# Patient Record
Sex: FEMALE | Race: WHITE | Employment: OTHER | URBAN - METROPOLITAN AREA
[De-identification: names, ages, dates, MRNs, and addresses within clinical notes are randomized per-mention and may not be internally consistent; named-entity substitution may affect disease eponyms.]

---

## 2018-04-24 DIAGNOSIS — I48.91 ATRIAL FIBRILLATION, UNSPECIFIED TYPE (HCC): ICD-10-CM

## 2018-05-04 ENCOUNTER — OFFICE VISIT (OUTPATIENT)
Dept: NON INVASIVE DIAGNOSTICS | Age: 68
End: 2018-05-04
Payer: COMMERCIAL

## 2018-05-04 VITALS
RESPIRATION RATE: 16 BRPM | DIASTOLIC BLOOD PRESSURE: 80 MMHG | HEART RATE: 55 BPM | WEIGHT: 192 LBS | HEIGHT: 66 IN | BODY MASS INDEX: 30.86 KG/M2 | SYSTOLIC BLOOD PRESSURE: 120 MMHG

## 2018-05-04 DIAGNOSIS — I49.5 SSS (SICK SINUS SYNDROME) (HCC): ICD-10-CM

## 2018-05-04 DIAGNOSIS — I48.91 ATRIAL FIBRILLATION, UNSPECIFIED TYPE (HCC): Primary | ICD-10-CM

## 2018-05-04 PROCEDURE — 93000 ELECTROCARDIOGRAM COMPLETE: CPT | Performed by: INTERNAL MEDICINE

## 2018-05-04 PROCEDURE — G8400 PT W/DXA NO RESULTS DOC: HCPCS | Performed by: INTERNAL MEDICINE

## 2018-05-04 PROCEDURE — 1123F ACP DISCUSS/DSCN MKR DOCD: CPT | Performed by: INTERNAL MEDICINE

## 2018-05-04 PROCEDURE — G8598 ASA/ANTIPLAT THER USED: HCPCS | Performed by: INTERNAL MEDICINE

## 2018-05-04 PROCEDURE — 99214 OFFICE O/P EST MOD 30 MIN: CPT | Performed by: INTERNAL MEDICINE

## 2018-05-04 PROCEDURE — 1090F PRES/ABSN URINE INCON ASSESS: CPT | Performed by: INTERNAL MEDICINE

## 2018-05-04 PROCEDURE — G8417 CALC BMI ABV UP PARAM F/U: HCPCS | Performed by: INTERNAL MEDICINE

## 2018-05-04 PROCEDURE — 4040F PNEUMOC VAC/ADMIN/RCVD: CPT | Performed by: INTERNAL MEDICINE

## 2018-05-04 PROCEDURE — G8427 DOCREV CUR MEDS BY ELIG CLIN: HCPCS | Performed by: INTERNAL MEDICINE

## 2018-05-04 PROCEDURE — 3017F COLORECTAL CA SCREEN DOC REV: CPT | Performed by: INTERNAL MEDICINE

## 2018-05-04 PROCEDURE — 1036F TOBACCO NON-USER: CPT | Performed by: INTERNAL MEDICINE

## 2018-12-03 ENCOUNTER — TELEPHONE (OUTPATIENT)
Dept: ADMINISTRATIVE | Age: 68
End: 2018-12-03

## 2018-12-04 ENCOUNTER — OFFICE VISIT (OUTPATIENT)
Dept: NON INVASIVE DIAGNOSTICS | Age: 68
End: 2018-12-04
Payer: COMMERCIAL

## 2018-12-04 VITALS
SYSTOLIC BLOOD PRESSURE: 134 MMHG | HEART RATE: 54 BPM | WEIGHT: 190 LBS | RESPIRATION RATE: 17 BRPM | HEIGHT: 69 IN | DIASTOLIC BLOOD PRESSURE: 78 MMHG | BODY MASS INDEX: 28.14 KG/M2

## 2018-12-04 DIAGNOSIS — I49.5 SSS (SICK SINUS SYNDROME) (HCC): Primary | ICD-10-CM

## 2018-12-04 PROCEDURE — 1036F TOBACCO NON-USER: CPT | Performed by: INTERNAL MEDICINE

## 2018-12-04 PROCEDURE — 1090F PRES/ABSN URINE INCON ASSESS: CPT | Performed by: INTERNAL MEDICINE

## 2018-12-04 PROCEDURE — 3017F COLORECTAL CA SCREEN DOC REV: CPT | Performed by: INTERNAL MEDICINE

## 2018-12-04 PROCEDURE — G8400 PT W/DXA NO RESULTS DOC: HCPCS | Performed by: INTERNAL MEDICINE

## 2018-12-04 PROCEDURE — 1101F PT FALLS ASSESS-DOCD LE1/YR: CPT | Performed by: INTERNAL MEDICINE

## 2018-12-04 PROCEDURE — G8417 CALC BMI ABV UP PARAM F/U: HCPCS | Performed by: INTERNAL MEDICINE

## 2018-12-04 PROCEDURE — G8598 ASA/ANTIPLAT THER USED: HCPCS | Performed by: INTERNAL MEDICINE

## 2018-12-04 PROCEDURE — 4040F PNEUMOC VAC/ADMIN/RCVD: CPT | Performed by: INTERNAL MEDICINE

## 2018-12-04 PROCEDURE — 99214 OFFICE O/P EST MOD 30 MIN: CPT | Performed by: INTERNAL MEDICINE

## 2018-12-04 PROCEDURE — 1123F ACP DISCUSS/DSCN MKR DOCD: CPT | Performed by: INTERNAL MEDICINE

## 2018-12-04 PROCEDURE — G8484 FLU IMMUNIZE NO ADMIN: HCPCS | Performed by: INTERNAL MEDICINE

## 2018-12-04 PROCEDURE — 93000 ELECTROCARDIOGRAM COMPLETE: CPT | Performed by: INTERNAL MEDICINE

## 2018-12-04 PROCEDURE — G8427 DOCREV CUR MEDS BY ELIG CLIN: HCPCS | Performed by: INTERNAL MEDICINE

## 2018-12-04 NOTE — TELEPHONE ENCOUNTER
I spoke with Rosio Bundy and let her know I had an opening with Dr Jacqueline Kaiser this afternoon at 1:00, pt scheduled.

## 2018-12-13 ENCOUNTER — OFFICE VISIT (OUTPATIENT)
Dept: CARDIOLOGY CLINIC | Age: 68
End: 2018-12-13
Payer: COMMERCIAL

## 2018-12-13 VITALS
BODY MASS INDEX: 32.15 KG/M2 | WEIGHT: 193 LBS | RESPIRATION RATE: 16 BRPM | DIASTOLIC BLOOD PRESSURE: 86 MMHG | HEART RATE: 56 BPM | SYSTOLIC BLOOD PRESSURE: 132 MMHG | HEIGHT: 65 IN

## 2018-12-13 DIAGNOSIS — I48.91 ATRIAL FIBRILLATION, UNSPECIFIED TYPE (HCC): Primary | ICD-10-CM

## 2018-12-13 PROCEDURE — 4040F PNEUMOC VAC/ADMIN/RCVD: CPT | Performed by: INTERNAL MEDICINE

## 2018-12-13 PROCEDURE — 99214 OFFICE O/P EST MOD 30 MIN: CPT | Performed by: INTERNAL MEDICINE

## 2018-12-13 PROCEDURE — G8417 CALC BMI ABV UP PARAM F/U: HCPCS | Performed by: INTERNAL MEDICINE

## 2018-12-13 PROCEDURE — G8598 ASA/ANTIPLAT THER USED: HCPCS | Performed by: INTERNAL MEDICINE

## 2018-12-13 PROCEDURE — 1101F PT FALLS ASSESS-DOCD LE1/YR: CPT | Performed by: INTERNAL MEDICINE

## 2018-12-13 PROCEDURE — 3017F COLORECTAL CA SCREEN DOC REV: CPT | Performed by: INTERNAL MEDICINE

## 2018-12-13 PROCEDURE — 1123F ACP DISCUSS/DSCN MKR DOCD: CPT | Performed by: INTERNAL MEDICINE

## 2018-12-13 PROCEDURE — G8484 FLU IMMUNIZE NO ADMIN: HCPCS | Performed by: INTERNAL MEDICINE

## 2018-12-13 PROCEDURE — G8427 DOCREV CUR MEDS BY ELIG CLIN: HCPCS | Performed by: INTERNAL MEDICINE

## 2018-12-13 PROCEDURE — 1036F TOBACCO NON-USER: CPT | Performed by: INTERNAL MEDICINE

## 2018-12-13 PROCEDURE — 1090F PRES/ABSN URINE INCON ASSESS: CPT | Performed by: INTERNAL MEDICINE

## 2018-12-13 PROCEDURE — 93000 ELECTROCARDIOGRAM COMPLETE: CPT | Performed by: INTERNAL MEDICINE

## 2018-12-13 PROCEDURE — G8400 PT W/DXA NO RESULTS DOC: HCPCS | Performed by: INTERNAL MEDICINE

## 2018-12-13 RX ORDER — METOPROLOL SUCCINATE 50 MG/1
TABLET, EXTENDED RELEASE ORAL
Refills: 0 | COMMUNITY
Start: 2018-12-01 | End: 2019-05-21

## 2018-12-13 NOTE — PROGRESS NOTES
current facility-administered medications for this visit. Social History     Social History    Marital status:      Spouse name: N/A    Number of children: N/A    Years of education: N/A     Occupational History    Not on file. Social History Main Topics    Smoking status: Former Smoker     Quit date: 7/8/2015    Smokeless tobacco: Never Used    Alcohol use No    Drug use: No    Sexual activity: Not on file     Other Topics Concern    Not on file     Social History Narrative    No narrative on file       History reviewed. No pertinent family history. Review of Systems:  Heart: as above   Lungs: as above   Eyes: denies changes in vision or discharge. Ears: denies changes in hearing or pain. Nose: denies epistaxis or masses   Throat: denies sore throat or trouble swallowing. Neuro: denies numbness, tingling, tremors. Skin: denies rashes or itching. : denies hematuria, dysuria   GI: denies vomiting, diarrhea   Psych: denies mood changed, anxiety, depression. All other systems negative. Physical Exam   /86   Pulse 56   Resp 16   Ht 5' 5\" (1.651 m)   Wt 193 lb (87.5 kg)   BMI 32.12 kg/m²   Constitutional: Oriented to person, place, and time. No distress. Pale. Head: Normocephalic and atraumatic. Eyes: EOM are normal. Pupils are equal, round, and reactive to light. Neck: Normal range of motion. Neck supple. No hepatojugular reflux and no JVD present. Carotid bruit is not present. No tracheal deviation present. No thyromegaly present. Cardiovascular: Normal rate, regular rhythm, normal heart sounds and intact distal pulses. Exam reveals no gallop and no friction rub. No murmur heard. Pulmonary/Chest: Effort normal and breath sounds normal. No respiratory distress. No wheezes. No rales. No tenderness. Abdominal: Soft. Bowel sounds are normal. No distension and no mass. No tenderness. No rebound and no guarding. Musculoskeletal: Normal range of motion.

## 2019-05-21 ENCOUNTER — OFFICE VISIT (OUTPATIENT)
Dept: CARDIOLOGY CLINIC | Age: 69
End: 2019-05-21
Payer: COMMERCIAL

## 2019-05-21 VITALS
HEART RATE: 55 BPM | HEIGHT: 65 IN | RESPIRATION RATE: 18 BRPM | WEIGHT: 196 LBS | DIASTOLIC BLOOD PRESSURE: 78 MMHG | BODY MASS INDEX: 32.65 KG/M2 | SYSTOLIC BLOOD PRESSURE: 130 MMHG

## 2019-05-21 DIAGNOSIS — I48.91 ATRIAL FIBRILLATION, UNSPECIFIED TYPE (HCC): Primary | ICD-10-CM

## 2019-05-21 PROCEDURE — 1036F TOBACCO NON-USER: CPT | Performed by: INTERNAL MEDICINE

## 2019-05-21 PROCEDURE — 93000 ELECTROCARDIOGRAM COMPLETE: CPT | Performed by: INTERNAL MEDICINE

## 2019-05-21 PROCEDURE — G8417 CALC BMI ABV UP PARAM F/U: HCPCS | Performed by: INTERNAL MEDICINE

## 2019-05-21 PROCEDURE — 1123F ACP DISCUSS/DSCN MKR DOCD: CPT | Performed by: INTERNAL MEDICINE

## 2019-05-21 PROCEDURE — G8427 DOCREV CUR MEDS BY ELIG CLIN: HCPCS | Performed by: INTERNAL MEDICINE

## 2019-05-21 PROCEDURE — 1090F PRES/ABSN URINE INCON ASSESS: CPT | Performed by: INTERNAL MEDICINE

## 2019-05-21 PROCEDURE — 99214 OFFICE O/P EST MOD 30 MIN: CPT | Performed by: INTERNAL MEDICINE

## 2019-05-21 PROCEDURE — 3017F COLORECTAL CA SCREEN DOC REV: CPT | Performed by: INTERNAL MEDICINE

## 2019-05-21 PROCEDURE — G8400 PT W/DXA NO RESULTS DOC: HCPCS | Performed by: INTERNAL MEDICINE

## 2019-05-21 PROCEDURE — 4040F PNEUMOC VAC/ADMIN/RCVD: CPT | Performed by: INTERNAL MEDICINE

## 2019-05-21 PROCEDURE — G8598 ASA/ANTIPLAT THER USED: HCPCS | Performed by: INTERNAL MEDICINE

## 2019-05-21 RX ORDER — METOPROLOL SUCCINATE 50 MG/1
50 TABLET, EXTENDED RELEASE ORAL DAILY
Qty: 90 TABLET | Refills: 3 | Status: SHIPPED | OUTPATIENT
Start: 2019-05-21 | End: 2019-10-22 | Stop reason: DRUGHIGH

## 2019-05-21 NOTE — PROGRESS NOTES
CHIEF COMPLAINT: FU CAD-MI/Afib/CMP    HISTORY OF PRESENT ILLNESS: Patient is a 68 y.o. female seen at the request of Rocky Penny DO. Patient presents in follow up. Patient was treated emergently for an inferior MI 7/8/15 with BMS to RCA at [facility]. She also has paroxysmal atrial fibrillation. In sinus today. No CP or SOB. Past Medical History:   Diagnosis Date    Acute systolic CHF (congestive heart failure) ([facility]) 2/17/2016    Asthma     Atrial fibrillation ([facility])     CAD (coronary artery disease)     GERD (gastroesophageal reflux disease)     Hiatal hernia     Macular degeneration     MI (myocardial infarction) ([facility] 2015    Obesity     S/P angioplasty with stent     Syncopal episodes     Weakness        Patient Active Problem List   Diagnosis    CAD (coronary artery disease)    Atrial fibrillation ([facility])    SSS (sick sinus syndrome) ([facility])    Obesity (BMI 30.0-34. 9)    VHD (valvular heart disease)       Allergies   Allergen Reactions    Ace Inhibitors Other (See Comments)     Cough    Bactrim [Sulfamethoxazole-Trimethoprim]     Erythromycin     Levaquin [Levofloxacin]     Lisinopril     Losartan     Pcn [Penicillins]     Sulfa Antibiotics        Current Outpatient Medications   Medication Sig Dispense Refill    apixaban (ELIQUIS) 5 MG TABS tablet Take 1 tablet by mouth 2 times daily 180 tablet 3    metoprolol succinate (TOPROL XL) 50 MG extended release tablet take 1 tablet by mouth twice a day  0    Calcium Carb-Cholecalciferol (CALCIUM 600+D3 PO) Take by mouth daily      fenofibrate (TRICOR) 48 MG tablet take 1 tablet by mouth once daily  0    sotalol (BETAPACE) 120 MG tablet Take 1 tablet by mouth every 12 hours 60 tablet 3    ranitidine (ZANTAC) 150 MG capsule Take 150 mg by mouth as needed for Heartburn      aspirin EC 81 MG EC tablet Take 81 mg by mouth daily      alendronate (FOSAMAX) 35 MG tablet Take 35 mg by mouth every 7 days   0     No current facility-administered medications for this visit. Social History     Socioeconomic History    Marital status:      Spouse name: Not on file    Number of children: Not on file    Years of education: Not on file    Highest education level: Not on file   Occupational History    Not on file   Social Needs    Financial resource strain: Not on file    Food insecurity:     Worry: Not on file     Inability: Not on file    Transportation needs:     Medical: Not on file     Non-medical: Not on file   Tobacco Use    Smoking status: Former Smoker     Last attempt to quit: 7/8/2015     Years since quitting: 3.8    Smokeless tobacco: Never Used   Substance and Sexual Activity    Alcohol use: No    Drug use: No    Sexual activity: Not on file   Lifestyle    Physical activity:     Days per week: Not on file     Minutes per session: Not on file    Stress: Not on file   Relationships    Social connections:     Talks on phone: Not on file     Gets together: Not on file     Attends Hinduism service: Not on file     Active member of club or organization: Not on file     Attends meetings of clubs or organizations: Not on file     Relationship status: Not on file    Intimate partner violence:     Fear of current or ex partner: Not on file     Emotionally abused: Not on file     Physically abused: Not on file     Forced sexual activity: Not on file   Other Topics Concern    Not on file   Social History Narrative    Not on file       History reviewed. No pertinent family history. Review of Systems:  Heart: as above   Lungs: as above   Eyes: denies changes in vision or discharge. Ears: denies changes in hearing or pain. Nose: denies epistaxis or masses   Throat: denies sore throat or trouble swallowing. Neuro: denies numbness, tingling, tremors. Skin: denies rashes or itching. : denies hematuria, dysuria   GI: denies vomiting, diarrhea   Psych: denies mood changed, anxiety, depression.   All other systems negative. Physical Exam   /78   Pulse 55   Resp 18   Ht 5' 5\" (1.651 m)   Wt 196 lb (88.9 kg)   BMI 32.62 kg/m²   Constitutional: Oriented to person, place, and time. No distress. Pale. Head: Normocephalic and atraumatic. Eyes: EOM are normal. Pupils are equal, round, and reactive to light. Neck: Normal range of motion. Neck supple. No hepatojugular reflux and no JVD present. Carotid bruit is not present. No tracheal deviation present. No thyromegaly present. Cardiovascular: Normal rate, regular rhythm, normal heart sounds and intact distal pulses. Exam reveals no gallop and no friction rub. No murmur heard. Pulmonary/Chest: Effort normal and breath sounds normal. No respiratory distress. No wheezes. No rales. No tenderness. Abdominal: Soft. Bowel sounds are normal. No distension and no mass. No tenderness. No rebound and no guarding. Musculoskeletal: Normal range of motion. No edema and no tenderness. Lymphadenopathy:   No cervical adenopathy. No groin adenopathy. Neurological: Alert and oriented to person, place, and time. Skin: Skin is warm and dry. No rash noted. Not diaphoretic. No erythema. Psychiatric: Normal mood and affect. Behavior is normal.     EKG: Sinus bradycardia, nonspecific ST and T waves changes. QTc is 478 ms. ASSESSMENT AND PLAN:  Patient Active Problem List   Diagnosis    CAD (coronary artery disease)    Atrial fibrillation (HCC)    SSS (sick sinus syndrome) (HCC)    Obesity (BMI 30.0-34. 9)    VHD (valvular heart disease)     1. PAfib: Followed by EP. In sinus. On sotalol and Eliquis. 2. CMP:    Continue Toprol and losartan. 3. CAD: Continue BB/statin/ASA. Anisa Moran D.O.   Cardiologist  Cardiology, [address] [place] [address]

## 2019-06-03 NOTE — PROGRESS NOTES
Electrophysiology Outpatient Progress Note    Destiny Aiken  1950  Date of Service: 6/6/2019  Referring Provider/PCP: Brain Wallace DO  Primary Electrophysiologist: Anatoly Vance MD    Chief Complaint: Persistent AF on chronic Sotalol therapy    SUBJECTIVE: Destiny Aiken presents to the office today for a follow -up. Since her last office visit Ms. Bill Mann states she feels overall well and denies any episodes of AF since her hospitalization at [facility] in April 2019. At that time she was seen by Dr. Fredrick Rodriguez and had a successful cardioversion and her Toprol was lowered from 50mg bid to 25 mg bid. She does report she was seen in December 2018 at [facility] for the same complaints. The patient denies any chest pain, dyspnea, palpitations, dizziness, syncope, orthopnea or paroxysmal nocturnal dyspnea. Discussed with Ms. Bill Mann that if she continues to have breakthrough episodes of AF will then consider changing Sotalol to Tikosyn. She states she does snore at night and has not been worked up for sleep apnea.       Patient Active Problem List    Diagnosis Date Noted    Obesity (BMI 30.0-34.9) 09/27/2016    VHD (valvular heart disease) 09/27/2016     Overview Note:     Moderate MR       [facility] 02/17/2016    CAD (coronary artery disease)     Atrial fibrillation (HCC)        Current Outpatient Medications   Medication Sig Dispense Refill    rosuvastatin (CRESTOR) 20 MG tablet Take 20 mg by mouth daily      metoprolol succinate (TOPROL XL) 50 MG extended release tablet Take 1 tablet by mouth daily 90 tablet 3    apixaban (ELIQUIS) 5 MG TABS tablet Take 1 tablet by mouth 2 times daily 180 tablet 3    alendronate (FOSAMAX) 35 MG tablet Take 35 mg by mouth every 7 days   0    fenofibrate (TRICOR) 48 MG tablet take 1 tablet by mouth once daily  0    sotalol (BETAPACE) 120 MG tablet Take 1 tablet by mouth every 12 hours 60 tablet 3    ranitidine (ZANTAC) 150 MG capsule Take 150 mg by mouth as needed for Heartburn      aspirin EC 81 MG EC tablet Take 81 mg by mouth daily       No current facility-administered medications for this visit. Allergies   Allergen Reactions    Ace Inhibitors Other (See Comments)     Cough    Bactrim [Sulfamethoxazole-Trimethoprim]     Erythromycin     Levaquin [Levofloxacin]     Lisinopril     Losartan     Pcn [Penicillins]     Sulfa Antibiotics        PHYSICAL EXAM:  Vitals:    06/06/19 0908   BP: 136/68   Pulse: 53   Resp: 18   Weight: 195 lb (88.5 kg)   Height: 5' 6\" (1.676 m)   Constitutional: Oriented to person, place, and time. Well-developed and cooperative. Obese female   Head: Normocephalic and atraumatic. Cardiovascular:  Normal S1/ S2, Feet appear to be well perfused. Regular rhythm present. PMI is not displaced. Pulmonary/Chest: Effort normal and breath sounds normal. No respiratory distress. Musculoskeletal: Normal range of motion of all extremities, no muscle weakness. Neurological: Alert and oriented to person, place, and time. Gait normal.   Skin: Skin is warm and dry. No bruising, no ecchymosis and no rash noted. Extremity: No clubbing or cyanosis. No edema. Psychiatric: Normal mood and affect. Thought content normal.     EKG 6/6/19: sinus bradycardia,  Rate 53, , QRS 86, QTc 472- see scanned cardiology    Assessment:     1. Persistent atrial fibrillation and flutter  - Discovered in 2000. - Symptomatic; SOB; palpitations   - [id number] 4 (Vasc disease, CAD, HF, female)  - Current anticoagulation includes Eliquis. - Previously on Multaq; stopped due to side effects.   - She has a contraindication to class IC antiarrhythmic drugs given the CAD. - Receiving Sotalol 120 mg every 12 hours; initiated 2/2016.  - Reccurent PAF in December 2018 and April 2019.  - S/p DC-cardioversion April 2019. - Now in SR, QTc stable.    - Re-education on importance of well controlled HTN (goal BP < 130/80),

## 2019-06-06 ENCOUNTER — OFFICE VISIT (OUTPATIENT)
Dept: NON INVASIVE DIAGNOSTICS | Age: 69
End: 2019-06-06
Payer: COMMERCIAL

## 2019-06-06 VITALS
HEART RATE: 53 BPM | RESPIRATION RATE: 18 BRPM | WEIGHT: 195 LBS | SYSTOLIC BLOOD PRESSURE: 136 MMHG | DIASTOLIC BLOOD PRESSURE: 68 MMHG | HEIGHT: 66 IN | BODY MASS INDEX: 31.34 KG/M2

## 2019-06-06 DIAGNOSIS — I48.91 ATRIAL FIBRILLATION, UNSPECIFIED TYPE (HCC): Primary | ICD-10-CM

## 2019-06-06 PROCEDURE — 99214 OFFICE O/P EST MOD 30 MIN: CPT | Performed by: INTERNAL MEDICINE

## 2019-06-06 PROCEDURE — 1090F PRES/ABSN URINE INCON ASSESS: CPT | Performed by: INTERNAL MEDICINE

## 2019-06-06 PROCEDURE — G8400 PT W/DXA NO RESULTS DOC: HCPCS | Performed by: INTERNAL MEDICINE

## 2019-06-06 PROCEDURE — 1123F ACP DISCUSS/DSCN MKR DOCD: CPT | Performed by: INTERNAL MEDICINE

## 2019-06-06 PROCEDURE — G8417 CALC BMI ABV UP PARAM F/U: HCPCS | Performed by: INTERNAL MEDICINE

## 2019-06-06 PROCEDURE — 1036F TOBACCO NON-USER: CPT | Performed by: INTERNAL MEDICINE

## 2019-06-06 PROCEDURE — 3017F COLORECTAL CA SCREEN DOC REV: CPT | Performed by: INTERNAL MEDICINE

## 2019-06-06 PROCEDURE — 93000 ELECTROCARDIOGRAM COMPLETE: CPT | Performed by: INTERNAL MEDICINE

## 2019-06-06 PROCEDURE — G8427 DOCREV CUR MEDS BY ELIG CLIN: HCPCS | Performed by: INTERNAL MEDICINE

## 2019-06-06 PROCEDURE — 4040F PNEUMOC VAC/ADMIN/RCVD: CPT | Performed by: INTERNAL MEDICINE

## 2019-06-06 PROCEDURE — G8598 ASA/ANTIPLAT THER USED: HCPCS | Performed by: INTERNAL MEDICINE

## 2019-06-06 RX ORDER — ROSUVASTATIN CALCIUM 20 MG/1
20 TABLET, COATED ORAL DAILY
Status: ON HOLD | COMMUNITY
End: 2019-11-06

## 2019-06-20 ENCOUNTER — OFFICE VISIT (OUTPATIENT)
Dept: CARDIOLOGY CLINIC | Age: 69
End: 2019-06-20
Payer: COMMERCIAL

## 2019-06-20 VITALS
SYSTOLIC BLOOD PRESSURE: 128 MMHG | RESPIRATION RATE: 18 BRPM | HEART RATE: 59 BPM | WEIGHT: 197 LBS | HEIGHT: 65 IN | BODY MASS INDEX: 32.82 KG/M2 | DIASTOLIC BLOOD PRESSURE: 82 MMHG

## 2019-06-20 DIAGNOSIS — I48.0 PAROXYSMAL ATRIAL FIBRILLATION (HCC): Primary | ICD-10-CM

## 2019-06-20 DIAGNOSIS — I25.5 ISCHEMIC CARDIOMYOPATHY: ICD-10-CM

## 2019-06-20 DIAGNOSIS — E66.9 NON MORBID OBESITY: ICD-10-CM

## 2019-06-20 DIAGNOSIS — R06.02 SOBOE (SHORTNESS OF BREATH ON EXERTION): ICD-10-CM

## 2019-06-20 DIAGNOSIS — I25.10 CORONARY ARTERY DISEASE INVOLVING NATIVE CORONARY ARTERY OF NATIVE HEART WITHOUT ANGINA PECTORIS: ICD-10-CM

## 2019-06-20 DIAGNOSIS — I34.0 NON-RHEUMATIC MITRAL REGURGITATION: ICD-10-CM

## 2019-06-20 PROCEDURE — 3017F COLORECTAL CA SCREEN DOC REV: CPT | Performed by: INTERNAL MEDICINE

## 2019-06-20 PROCEDURE — G8427 DOCREV CUR MEDS BY ELIG CLIN: HCPCS | Performed by: INTERNAL MEDICINE

## 2019-06-20 PROCEDURE — G8417 CALC BMI ABV UP PARAM F/U: HCPCS | Performed by: INTERNAL MEDICINE

## 2019-06-20 PROCEDURE — 1123F ACP DISCUSS/DSCN MKR DOCD: CPT | Performed by: INTERNAL MEDICINE

## 2019-06-20 PROCEDURE — 99214 OFFICE O/P EST MOD 30 MIN: CPT | Performed by: INTERNAL MEDICINE

## 2019-06-20 PROCEDURE — G8400 PT W/DXA NO RESULTS DOC: HCPCS | Performed by: INTERNAL MEDICINE

## 2019-06-20 PROCEDURE — 93000 ELECTROCARDIOGRAM COMPLETE: CPT | Performed by: INTERNAL MEDICINE

## 2019-06-20 PROCEDURE — 1036F TOBACCO NON-USER: CPT | Performed by: INTERNAL MEDICINE

## 2019-06-20 PROCEDURE — G8598 ASA/ANTIPLAT THER USED: HCPCS | Performed by: INTERNAL MEDICINE

## 2019-06-20 PROCEDURE — 1090F PRES/ABSN URINE INCON ASSESS: CPT | Performed by: INTERNAL MEDICINE

## 2019-06-20 PROCEDURE — 4040F PNEUMOC VAC/ADMIN/RCVD: CPT | Performed by: INTERNAL MEDICINE

## 2019-06-20 RX ORDER — SOTALOL HYDROCHLORIDE 120 MG/1
120 TABLET ORAL EVERY 12 HOURS
Qty: 180 TABLET | Refills: 3 | Status: SHIPPED | OUTPATIENT
Start: 2019-06-20 | End: 2019-10-22 | Stop reason: SDUPTHER

## 2019-06-20 NOTE — PROGRESS NOTES
OFFICE VISIT     PRIMARY CARE PHYSICIAN:      Bridger Cao DO       ALLERGIES / SENSITIVITIES:        Allergies   Allergen Reactions    Ace Inhibitors Other (See Comments)     Cough    Bactrim [Sulfamethoxazole-Trimethoprim]     Erythromycin     Levaquin [Levofloxacin]     Lisinopril     Losartan     Pcn [Penicillins]     Sulfa Antibiotics           REVIEWED MEDICATIONS:        Current Outpatient Medications:     sotalol (BETAPACE) 120 MG tablet, Take 1 tablet by mouth every 12 hours, Disp: 180 tablet, Rfl: 3    rosuvastatin (CRESTOR) 20 MG tablet, Take 20 mg by mouth daily, Disp: , Rfl:     metoprolol succinate (TOPROL XL) 50 MG extended release tablet, Take 1 tablet by mouth daily, Disp: 90 tablet, Rfl: 3    apixaban (ELIQUIS) 5 MG TABS tablet, Take 1 tablet by mouth 2 times daily, Disp: 180 tablet, Rfl: 3    alendronate (FOSAMAX) 35 MG tablet, Take 35 mg by mouth every 7 days , Disp: , Rfl: 0    fenofibrate (TRICOR) 48 MG tablet, take 1 tablet by mouth once daily, Disp: , Rfl: 0    ranitidine (ZANTAC) 150 MG capsule, Take 150 mg by mouth as needed for Heartburn, Disp: , Rfl:     aspirin EC 81 MG EC tablet, Take 81 mg by mouth daily, Disp: , Rfl:       S: REASON FOR VISIT:       Chief Complaint   Patient presents with    Follow-Up from Hospital     ER follow up          History of Present Illness:       Office Visit for follow up of CAD, A FFib   Seen in ER few times for palpitaitons   Seen by Dr Javier Mina 6/6/19   C/o mild DENG, No pND or orhtopnea      No hospitalizations or surgeries since last visit   Delfino any exertional chest pain   Active at home   No orthopnea   Try to watch diet   Compliant with all medications       Past Medical History:   Diagnosis Date    Acute systolic CHF (congestive heart failure) ([facility] 2/17/2016    Asthma     Atrial fibrillation ([facility]     CAD (coronary artery disease)     GERD (gastroesophageal reflux disease)     Hiatal hernia     Macular degeneration  [facility]    Obesity     S/P angioplasty with stent     Syncopal episodes     Weakness             Past Surgical History:   Procedure Laterality Date    CARDIOVERSION  02/18/2016    CHOLECYSTECTOMY      COLONOSCOPY      CORONARY ANGIOPLASTY WITH STENT PLACEMENT  07/08/2015    ENDOSCOPY, COLON, DIAGNOSTIC      TUBAL LIGATION      VARICOSE VEIN SURGERY          History reviewed. No pertinent family history. Social History     Tobacco Use    Smoking status: Former Smoker     Last attempt to quit: 7/8/2015     Years since quitting: 3.9    Smokeless tobacco: Never Used   Substance Use Topics    Alcohol use: No         Review of Systems:  HEENT: negative for acute visual symptoms or auditory problems, no dysphagia  Constitutional: negative for fever and chills, or significant weight loss  Respiratory: negative for cough, wheezing, or hemoptysis  Cardiovascular: negative for chest pain, palpitations, and +ve dyspnea  Gastrointestinal: negative for abdominal pain, diarrhea, nausea and vomiting  Endocrine: Negative for polyuria and polydyspsia  Genitourinary:negative for dysuria and hematuria  Derm: negative for rash and skin lesion(s)  Neurological: negative for tingling, numbness, weakness, seizures and tremors  Endocrine: negative for polydipsia and polyuria  Musculoskeletal: negative for pain or tenderness  Psychiatric: negative for anxiety, depression, or suicidal ideations         O:  COMPLETE PHYSICAL EXAM:       /82   Pulse 59   Resp 18   Ht 5' 5\" (1.651 m)   Wt 197 lb (89.4 kg)   BMI 32.78 kg/m²       General:   Patient alert, comfortable, no distress. Appears stated age. HEENT:    Pupils equal, no icterus, no nasal drainage, tongue moist.   Neck:              No masses, Thyroid not palpable. Chest:   Normal configuration, non tender. Lungs:   Clear to auscultation bilaterally, few scattered rhonchi.    Cardiovascular:  Regular rhythm, 1/6 systolic murmur, No S3, no palpable thrills, No elevated JVD, No carotid bruit. Abdomen:  Soft, Non tender, Bowel sounds normal, no pulsatile abdominal aorta, no palpable masses. Extremities:  +No edema. Distal pulses palpable. No cyanosis, no clubbing. Skin:   Good turgor, warm and dry, no cyanosis. Musculoskeletal: No joint swelling or deformity. Neuro:   Cranial nerves grossly intact; No focal neurologic deficit. Psych:   Alert, good mood and effect. REVIEW OF DIAGNOSTIC TESTS:        Electrocardiogram: NSR, Normal QTc interval                A/P:   ASSESSMENT / PLAN:    Angy Escalera was seen today for follow-up from hospital.    Diagnoses and all orders for this visit:    Paroxysmal atrial fibrillation ([address] - Follows with EP - s/p DCCV, On Eliquis, Sotalol    Coronary artery disease involving native coronary artery of native heart without angina pectoris s/p BMS to RCA 2015-On ASA, BB, Statins  -     ECHO Complete 2D W Doppler W Color; Future  -     Stress test, lexiscan; Future    Ischemic cardiomyopathy EF about 40% - Continue BB - Allery to ACE/ARB  -     ECHO Complete 2D W Doppler W Color; Future  -     Stress test, lexiscan; Future    Non morbid obesity - Diet, exercise and weight loss discussed. ACE-I Allergy    Non-rheumatic mitral regurgitation  -     ECHO Complete 2D W Doppler W Color; Future    SOBOE (shortness of breath on exertion)  -     ECHO Complete 2D W Doppler W Color; Future  -     Stress test, lexiscan; Future    Other orders  -     EKG 12 lead  -     sotalol (BETAPACE) 120 MG tablet; Take 1 tablet by mouth every 12 hours       Preventive Cardiology: Low cholesterol diet, regular exercise as tolerate, and gradual weight loss discussed. Monitor BP and heart rates. All questions answered about cardiac diagnoses and cardiac medications. Continue current medications. Compliance with medications and f/u with all physicians discussed.    Risk factor modification based on risk profile discussed. Call if any exertional chest pain, short of breath, dizzy or palpitations   Follow up in 6 months or earlier if needed.          [facility]  [address], [place], [address] [facility]  [phone]

## 2019-07-03 DIAGNOSIS — I25.10 CORONARY ARTERY DISEASE INVOLVING NATIVE CORONARY ARTERY OF NATIVE HEART WITHOUT ANGINA PECTORIS: ICD-10-CM

## 2019-07-03 DIAGNOSIS — R06.02 SOBOE (SHORTNESS OF BREATH ON EXERTION): ICD-10-CM

## 2019-07-03 DIAGNOSIS — I25.5 ISCHEMIC CARDIOMYOPATHY: ICD-10-CM

## 2019-10-22 ENCOUNTER — OFFICE VISIT (OUTPATIENT)
Dept: CARDIOLOGY CLINIC | Age: 69
End: 2019-10-22
Payer: COMMERCIAL

## 2019-10-22 VITALS
RESPIRATION RATE: 18 BRPM | HEIGHT: 65 IN | DIASTOLIC BLOOD PRESSURE: 80 MMHG | SYSTOLIC BLOOD PRESSURE: 136 MMHG | HEART RATE: 61 BPM | WEIGHT: 193 LBS | BODY MASS INDEX: 32.15 KG/M2

## 2019-10-22 DIAGNOSIS — E66.9 NON MORBID OBESITY: ICD-10-CM

## 2019-10-22 DIAGNOSIS — I34.0 NON-RHEUMATIC MITRAL REGURGITATION: ICD-10-CM

## 2019-10-22 DIAGNOSIS — I25.10 CORONARY ARTERY DISEASE INVOLVING NATIVE CORONARY ARTERY OF NATIVE HEART WITHOUT ANGINA PECTORIS: ICD-10-CM

## 2019-10-22 DIAGNOSIS — I48.0 PAROXYSMAL ATRIAL FIBRILLATION (HCC): Primary | ICD-10-CM

## 2019-10-22 DIAGNOSIS — I27.20 PULMONARY HTN (HCC): ICD-10-CM

## 2019-10-22 DIAGNOSIS — I36.1 NONRHEUMATIC TRICUSPID VALVE REGURGITATION: ICD-10-CM

## 2019-10-22 PROCEDURE — 1123F ACP DISCUSS/DSCN MKR DOCD: CPT | Performed by: INTERNAL MEDICINE

## 2019-10-22 PROCEDURE — 1090F PRES/ABSN URINE INCON ASSESS: CPT | Performed by: INTERNAL MEDICINE

## 2019-10-22 PROCEDURE — 99214 OFFICE O/P EST MOD 30 MIN: CPT | Performed by: INTERNAL MEDICINE

## 2019-10-22 PROCEDURE — G8427 DOCREV CUR MEDS BY ELIG CLIN: HCPCS | Performed by: INTERNAL MEDICINE

## 2019-10-22 PROCEDURE — G8417 CALC BMI ABV UP PARAM F/U: HCPCS | Performed by: INTERNAL MEDICINE

## 2019-10-22 PROCEDURE — 1036F TOBACCO NON-USER: CPT | Performed by: INTERNAL MEDICINE

## 2019-10-22 PROCEDURE — 93000 ELECTROCARDIOGRAM COMPLETE: CPT | Performed by: INTERNAL MEDICINE

## 2019-10-22 PROCEDURE — G8598 ASA/ANTIPLAT THER USED: HCPCS | Performed by: INTERNAL MEDICINE

## 2019-10-22 PROCEDURE — G8400 PT W/DXA NO RESULTS DOC: HCPCS | Performed by: INTERNAL MEDICINE

## 2019-10-22 PROCEDURE — 4040F PNEUMOC VAC/ADMIN/RCVD: CPT | Performed by: INTERNAL MEDICINE

## 2019-10-22 PROCEDURE — G8484 FLU IMMUNIZE NO ADMIN: HCPCS | Performed by: INTERNAL MEDICINE

## 2019-10-22 PROCEDURE — 3017F COLORECTAL CA SCREEN DOC REV: CPT | Performed by: INTERNAL MEDICINE

## 2019-10-22 RX ORDER — SOTALOL HYDROCHLORIDE 120 MG/1
120 TABLET ORAL EVERY 12 HOURS
Qty: 180 TABLET | Refills: 3 | Status: SHIPPED
Start: 2019-10-22 | End: 2021-02-09 | Stop reason: SDUPTHER

## 2019-10-22 RX ORDER — METOPROLOL SUCCINATE 25 MG/1
25 TABLET, EXTENDED RELEASE ORAL DAILY
Qty: 90 TABLET | Refills: 3 | Status: ON HOLD
Start: 2019-10-22 | End: 2019-11-08 | Stop reason: HOSPADM

## 2019-10-22 RX ORDER — ERGOCALCIFEROL 1.25 MG/1
50000 CAPSULE ORAL WEEKLY
Refills: 0 | COMMUNITY
Start: 2019-10-02 | End: 2022-09-06

## 2019-11-06 ENCOUNTER — HOSPITAL ENCOUNTER (INPATIENT)
Age: 69
LOS: 2 days | Discharge: HOME | DRG: 069 | End: 2019-11-08
Attending: HOSPITALIST | Admitting: HOSPITALIST
Payer: COMMERCIAL

## 2019-11-06 PROBLEM — G45.9 TIA (TRANSIENT ISCHEMIC ATTACK): Status: ACTIVE | Noted: 2019-11-06

## 2019-11-06 PROCEDURE — 6370000000 HC RX 637 (ALT 250 FOR IP): Performed by: HOSPITALIST

## 2019-11-06 PROCEDURE — 97161 PT EVAL LOW COMPLEX 20 MIN: CPT

## 2019-11-06 PROCEDURE — 2060000000 HC ICU INTERMEDIATE R&B

## 2019-11-06 PROCEDURE — 99253 IP/OBS CNSLTJ NEW/EST LOW 45: CPT | Performed by: PSYCHIATRY & NEUROLOGY

## 2019-11-06 PROCEDURE — 2580000003 HC RX 258: Performed by: HOSPITALIST

## 2019-11-06 PROCEDURE — 92523 SPEECH SOUND LANG COMPREHEN: CPT

## 2019-11-06 RX ORDER — FAMOTIDINE 20 MG/1
20 TABLET, FILM COATED ORAL 2 TIMES DAILY
Status: DISCONTINUED | OUTPATIENT
Start: 2019-11-06 | End: 2019-11-08 | Stop reason: HOSPADM

## 2019-11-06 RX ORDER — ASPIRIN 81 MG/1
81 TABLET ORAL DAILY
Status: DISCONTINUED | OUTPATIENT
Start: 2019-11-06 | End: 2019-11-08 | Stop reason: HOSPADM

## 2019-11-06 RX ORDER — SODIUM CHLORIDE 0.9 % (FLUSH) 0.9 %
10 SYRINGE (ML) INJECTION EVERY 12 HOURS SCHEDULED
Status: DISCONTINUED | OUTPATIENT
Start: 2019-11-06 | End: 2019-11-08 | Stop reason: HOSPADM

## 2019-11-06 RX ORDER — FENOFIBRATE 54 MG/1
54 TABLET ORAL DAILY
Status: DISCONTINUED | OUTPATIENT
Start: 2019-11-06 | End: 2019-11-08 | Stop reason: HOSPADM

## 2019-11-06 RX ORDER — METOPROLOL SUCCINATE 25 MG/1
25 TABLET, EXTENDED RELEASE ORAL DAILY
Status: DISCONTINUED | OUTPATIENT
Start: 2019-11-06 | End: 2019-11-07

## 2019-11-06 RX ORDER — ERGOCALCIFEROL 1.25 MG/1
50000 CAPSULE ORAL WEEKLY
Status: DISCONTINUED | OUTPATIENT
Start: 2019-11-08 | End: 2019-11-08 | Stop reason: HOSPADM

## 2019-11-06 RX ORDER — ONDANSETRON 2 MG/ML
4 INJECTION INTRAMUSCULAR; INTRAVENOUS EVERY 6 HOURS PRN
Status: DISCONTINUED | OUTPATIENT
Start: 2019-11-06 | End: 2019-11-07

## 2019-11-06 RX ORDER — SODIUM CHLORIDE 0.9 % (FLUSH) 0.9 %
10 SYRINGE (ML) INJECTION PRN
Status: DISCONTINUED | OUTPATIENT
Start: 2019-11-06 | End: 2019-11-08 | Stop reason: HOSPADM

## 2019-11-06 RX ORDER — SOTALOL HYDROCHLORIDE 120 MG/1
120 TABLET ORAL EVERY 12 HOURS
Status: DISCONTINUED | OUTPATIENT
Start: 2019-11-06 | End: 2019-11-08 | Stop reason: HOSPADM

## 2019-11-06 RX ADMIN — Medication 10 ML: at 21:34

## 2019-11-06 RX ADMIN — SOTALOL HYDROCHLORIDE 120 MG: 120 TABLET ORAL at 21:34

## 2019-11-06 RX ADMIN — FAMOTIDINE 20 MG: 20 TABLET ORAL at 21:33

## 2019-11-06 RX ADMIN — Medication 10 ML: at 08:52

## 2019-11-06 RX ADMIN — FAMOTIDINE 20 MG: 20 TABLET ORAL at 08:51

## 2019-11-06 RX ADMIN — APIXABAN 5 MG: 5 TABLET, FILM COATED ORAL at 08:51

## 2019-11-06 RX ADMIN — APIXABAN 5 MG: 5 TABLET, FILM COATED ORAL at 21:33

## 2019-11-06 RX ADMIN — METOPROLOL SUCCINATE 25 MG: 25 TABLET, EXTENDED RELEASE ORAL at 08:51

## 2019-11-06 RX ADMIN — FENOFIBRATE 54 MG: 54 TABLET ORAL at 08:51

## 2019-11-06 RX ADMIN — ASPIRIN 81 MG: 81 TABLET, COATED ORAL at 08:50

## 2019-11-06 ASSESSMENT — PAIN SCALES - GENERAL
PAINLEVEL_OUTOF10: 0

## 2019-11-06 ASSESSMENT — VISUAL ACUITY: VA_NORMAL: 1

## 2019-11-06 ASSESSMENT — ENCOUNTER SYMPTOMS
EYE ITCHING: 1
EYE PAIN: 0
EYE REDNESS: 0
EYE DISCHARGE: 0
PHOTOPHOBIA: 0
GASTROINTESTINAL NEGATIVE: 1
RESPIRATORY NEGATIVE: 1

## 2019-11-07 ENCOUNTER — APPOINTMENT (OUTPATIENT)
Dept: CT IMAGING | Age: 69
DRG: 069 | End: 2019-11-07
Attending: HOSPITALIST
Payer: COMMERCIAL

## 2019-11-07 ENCOUNTER — APPOINTMENT (OUTPATIENT)
Dept: MRI IMAGING | Age: 69
DRG: 069 | End: 2019-11-07
Attending: HOSPITALIST
Payer: COMMERCIAL

## 2019-11-07 LAB
ANION GAP SERPL CALCULATED.3IONS-SCNC: 13 MMOL/L (ref 7–16)
BASOPHILS ABSOLUTE: 0.05 E9/L (ref 0–0.2)
BASOPHILS RELATIVE PERCENT: 0.7 % (ref 0–2)
BUN BLDV-MCNC: 17 MG/DL (ref 8–23)
CALCIUM SERPL-MCNC: 8.9 MG/DL (ref 8.6–10.2)
CHLORIDE BLD-SCNC: 104 MMOL/L (ref 98–107)
CO2: 25 MMOL/L (ref 22–29)
CREAT SERPL-MCNC: 0.8 MG/DL (ref 0.5–1)
EOSINOPHILS ABSOLUTE: 0.33 E9/L (ref 0.05–0.5)
EOSINOPHILS RELATIVE PERCENT: 4.4 % (ref 0–6)
GFR AFRICAN AMERICAN: >60
GFR NON-AFRICAN AMERICAN: >60 ML/MIN/1.73
GLUCOSE BLD-MCNC: 93 MG/DL (ref 74–99)
HCT VFR BLD CALC: 43.4 % (ref 34–48)
HEMOGLOBIN: 13.4 G/DL (ref 11.5–15.5)
IMMATURE GRANULOCYTES #: 0.04 E9/L
IMMATURE GRANULOCYTES %: 0.5 % (ref 0–5)
LYMPHOCYTES ABSOLUTE: 2.4 E9/L (ref 1.5–4)
LYMPHOCYTES RELATIVE PERCENT: 31.7 % (ref 20–42)
MAGNESIUM: 1.9 MG/DL (ref 1.6–2.6)
MCH RBC QN AUTO: 27.7 PG (ref 26–35)
MCHC RBC AUTO-ENTMCNC: 30.9 % (ref 32–34.5)
MCV RBC AUTO: 89.7 FL (ref 80–99.9)
MONOCYTES ABSOLUTE: 0.61 E9/L (ref 0.1–0.95)
MONOCYTES RELATIVE PERCENT: 8 % (ref 2–12)
NEUTROPHILS ABSOLUTE: 4.15 E9/L (ref 1.8–7.3)
NEUTROPHILS RELATIVE PERCENT: 54.7 % (ref 43–80)
PDW BLD-RTO: 15 FL (ref 11.5–15)
PLATELET # BLD: 553 E9/L (ref 130–450)
PMV BLD AUTO: 10.3 FL (ref 7–12)
POTASSIUM REFLEX MAGNESIUM: 4 MMOL/L (ref 3.5–5)
RBC # BLD: 4.84 E12/L (ref 3.5–5.5)
SODIUM BLD-SCNC: 142 MMOL/L (ref 132–146)
WBC # BLD: 7.6 E9/L (ref 4.5–11.5)

## 2019-11-07 PROCEDURE — 85025 COMPLETE CBC W/AUTO DIFF WBC: CPT

## 2019-11-07 PROCEDURE — 6370000000 HC RX 637 (ALT 250 FOR IP): Performed by: HOSPITALIST

## 2019-11-07 PROCEDURE — 99232 SBSQ HOSP IP/OBS MODERATE 35: CPT | Performed by: PHYSICIAN ASSISTANT

## 2019-11-07 PROCEDURE — 70498 CT ANGIOGRAPHY NECK: CPT

## 2019-11-07 PROCEDURE — 36415 COLL VENOUS BLD VENIPUNCTURE: CPT

## 2019-11-07 PROCEDURE — 6360000004 HC RX CONTRAST MEDICATION: Performed by: RADIOLOGY

## 2019-11-07 PROCEDURE — 70496 CT ANGIOGRAPHY HEAD: CPT

## 2019-11-07 PROCEDURE — 80048 BASIC METABOLIC PNL TOTAL CA: CPT

## 2019-11-07 PROCEDURE — 2060000000 HC ICU INTERMEDIATE R&B

## 2019-11-07 PROCEDURE — 70553 MRI BRAIN STEM W/O & W/DYE: CPT

## 2019-11-07 PROCEDURE — 83735 ASSAY OF MAGNESIUM: CPT

## 2019-11-07 PROCEDURE — 2580000003 HC RX 258: Performed by: HOSPITALIST

## 2019-11-07 PROCEDURE — A9579 GAD-BASE MR CONTRAST NOS,1ML: HCPCS | Performed by: RADIOLOGY

## 2019-11-07 RX ADMIN — Medication 10 ML: at 08:14

## 2019-11-07 RX ADMIN — METOPROLOL SUCCINATE 25 MG: 25 TABLET, EXTENDED RELEASE ORAL at 08:55

## 2019-11-07 RX ADMIN — SOTALOL HYDROCHLORIDE 120 MG: 120 TABLET ORAL at 21:44

## 2019-11-07 RX ADMIN — ASPIRIN 81 MG: 81 TABLET, COATED ORAL at 08:54

## 2019-11-07 RX ADMIN — FENOFIBRATE 54 MG: 54 TABLET ORAL at 08:55

## 2019-11-07 RX ADMIN — APIXABAN 5 MG: 5 TABLET, FILM COATED ORAL at 21:43

## 2019-11-07 RX ADMIN — FAMOTIDINE 20 MG: 20 TABLET ORAL at 21:43

## 2019-11-07 RX ADMIN — Medication 10 ML: at 21:44

## 2019-11-07 RX ADMIN — SOTALOL HYDROCHLORIDE 120 MG: 120 TABLET ORAL at 08:54

## 2019-11-07 RX ADMIN — IOPAMIDOL 60 ML: 755 INJECTION, SOLUTION INTRAVENOUS at 08:14

## 2019-11-07 RX ADMIN — APIXABAN 5 MG: 5 TABLET, FILM COATED ORAL at 08:55

## 2019-11-07 RX ADMIN — Medication 10 ML: at 08:55

## 2019-11-07 RX ADMIN — GADOTERIDOL 20 ML: 279.3 INJECTION, SOLUTION INTRAVENOUS at 11:54

## 2019-11-07 RX ADMIN — FAMOTIDINE 20 MG: 20 TABLET ORAL at 08:55

## 2019-11-07 ASSESSMENT — PAIN SCALES - GENERAL
PAINLEVEL_OUTOF10: 0

## 2019-11-08 VITALS
TEMPERATURE: 98.8 F | DIASTOLIC BLOOD PRESSURE: 74 MMHG | BODY MASS INDEX: 31.68 KG/M2 | WEIGHT: 190.13 LBS | RESPIRATION RATE: 22 BRPM | OXYGEN SATURATION: 95 % | HEART RATE: 50 BPM | SYSTOLIC BLOOD PRESSURE: 152 MMHG | HEIGHT: 65 IN

## 2019-11-08 LAB
ANION GAP SERPL CALCULATED.3IONS-SCNC: 10 MMOL/L (ref 7–16)
BUN BLDV-MCNC: 16 MG/DL (ref 8–23)
CALCIUM SERPL-MCNC: 9 MG/DL (ref 8.6–10.2)
CHLORIDE BLD-SCNC: 102 MMOL/L (ref 98–107)
CO2: 26 MMOL/L (ref 22–29)
CREAT SERPL-MCNC: 0.7 MG/DL (ref 0.5–1)
EKG ATRIAL RATE: 50 BPM
EKG ATRIAL RATE: 60 BPM
EKG P AXIS: 23 DEGREES
EKG P AXIS: 56 DEGREES
EKG P-R INTERVAL: 156 MS
EKG P-R INTERVAL: 164 MS
EKG Q-T INTERVAL: 486 MS
EKG Q-T INTERVAL: 494 MS
EKG QRS DURATION: 86 MS
EKG QRS DURATION: 88 MS
EKG QTC CALCULATION (BAZETT): 443 MS
EKG QTC CALCULATION (BAZETT): 494 MS
EKG R AXIS: 18 DEGREES
EKG R AXIS: 21 DEGREES
EKG T AXIS: -12 DEGREES
EKG T AXIS: -14 DEGREES
EKG VENTRICULAR RATE: 50 BPM
EKG VENTRICULAR RATE: 60 BPM
GFR AFRICAN AMERICAN: >60
GFR NON-AFRICAN AMERICAN: >60 ML/MIN/1.73
GLUCOSE BLD-MCNC: 88 MG/DL (ref 74–99)
MAGNESIUM: 1.9 MG/DL (ref 1.6–2.6)
POTASSIUM SERPL-SCNC: 3.8 MMOL/L (ref 3.5–5)
SODIUM BLD-SCNC: 138 MMOL/L (ref 132–146)

## 2019-11-08 PROCEDURE — 6370000000 HC RX 637 (ALT 250 FOR IP): Performed by: HOSPITALIST

## 2019-11-08 PROCEDURE — 83735 ASSAY OF MAGNESIUM: CPT

## 2019-11-08 PROCEDURE — 93010 ELECTROCARDIOGRAM REPORT: CPT | Performed by: INTERNAL MEDICINE

## 2019-11-08 PROCEDURE — 2580000003 HC RX 258: Performed by: HOSPITALIST

## 2019-11-08 PROCEDURE — 36415 COLL VENOUS BLD VENIPUNCTURE: CPT

## 2019-11-08 PROCEDURE — 93005 ELECTROCARDIOGRAM TRACING: CPT | Performed by: NURSE PRACTITIONER

## 2019-11-08 PROCEDURE — 80048 BASIC METABOLIC PNL TOTAL CA: CPT

## 2019-11-08 PROCEDURE — 99223 1ST HOSP IP/OBS HIGH 75: CPT | Performed by: INTERNAL MEDICINE

## 2019-11-08 RX ADMIN — FENOFIBRATE 54 MG: 54 TABLET ORAL at 09:22

## 2019-11-08 RX ADMIN — Medication 10 ML: at 09:22

## 2019-11-08 RX ADMIN — ASPIRIN 81 MG: 81 TABLET, COATED ORAL at 09:22

## 2019-11-08 RX ADMIN — SOTALOL HYDROCHLORIDE 120 MG: 120 TABLET ORAL at 09:21

## 2019-11-08 RX ADMIN — ERGOCALCIFEROL 50000 UNITS: 1.25 CAPSULE ORAL at 09:22

## 2019-11-08 RX ADMIN — APIXABAN 5 MG: 5 TABLET, FILM COATED ORAL at 09:21

## 2019-11-08 RX ADMIN — FAMOTIDINE 20 MG: 20 TABLET ORAL at 09:21

## 2019-11-08 ASSESSMENT — PAIN SCALES - GENERAL
PAINLEVEL_OUTOF10: 0
PAINLEVEL_OUTOF10: 0

## 2019-11-11 DIAGNOSIS — R00.1 BRADYCARDIA: ICD-10-CM

## 2019-11-11 DIAGNOSIS — I49.5 SSS (SICK SINUS SYNDROME) (HCC): ICD-10-CM

## 2019-11-11 DIAGNOSIS — I48.19 PERSISTENT ATRIAL FIBRILLATION (HCC): Primary | ICD-10-CM

## 2019-11-13 ENCOUNTER — NURSE ONLY (OUTPATIENT)
Dept: NON INVASIVE DIAGNOSTICS | Age: 69
End: 2019-11-13

## 2019-12-05 DIAGNOSIS — I48.19 PERSISTENT ATRIAL FIBRILLATION (HCC): ICD-10-CM

## 2019-12-05 DIAGNOSIS — R00.1 BRADYCARDIA: ICD-10-CM

## 2019-12-05 DIAGNOSIS — I49.5 SSS (SICK SINUS SYNDROME) (HCC): ICD-10-CM

## 2019-12-10 ENCOUNTER — TELEPHONE (OUTPATIENT)
Dept: ADMINISTRATIVE | Age: 69
End: 2019-12-10

## 2020-01-04 ENCOUNTER — TELEPHONE (OUTPATIENT)
Dept: NON INVASIVE DIAGNOSTICS | Age: 70
End: 2020-01-04

## 2020-01-28 NOTE — PROGRESS NOTES
hours 180 tablet 3    fenofibrate (TRICOR) 48 MG tablet take 1 tablet by mouth once daily  0    ranitidine (ZANTAC) 150 MG capsule Take 150 mg by mouth as needed for Heartburn      aspirin EC 81 MG EC tablet Take 81 mg by mouth daily       No current facility-administered medications for this visit. Allergies   Allergen Reactions    Ace Inhibitors Other (See Comments)     Cough    Bactrim [Sulfamethoxazole-Trimethoprim]     Erythromycin     Levaquin [Levofloxacin]     Lisinopril     Losartan     Pcn [Penicillins]     Sulfa Antibiotics     Trimethoprim        PHYSICAL EXAM:  Vitals:    01/31/20 0931   BP: (!) 150/78   Pulse: 53   Resp: 18   Weight: 195 lb (88.5 kg)   Height: 5' 5\" (1.651 m)   Constitutional: Oriented to person, place, and time. Well-developed and cooperative. Obese female   Head: Normocephalic and atraumatic. Cardiovascular:  Normal S1/ S2, Feet appear to be well perfused. Bradycardic. PMI is not displaced. Pulmonary/Chest: Effort normal and breath sounds normal. No respiratory distress. Musculoskeletal: Normal range of motion of all extremities, no muscle weakness. Neurological: Alert and oriented to person, place, and time. Gait normal.   Skin: Skin is warm and dry. No bruising, no ecchymosis and no rash noted. Extremity: No clubbing or cyanosis. No edema. Psychiatric: Normal mood and affect. Thought content normal.     EKG 1/31/20: sinus bradycardia,  Rate 53, , QRS 92, QTc 482- see scanned cardiology    Cardiac event monitor 1/3/20:    Assessment:     1. Persistent atrial fibrillation and flutter  - Discovered in 2000. - Symptomatic.  - [id number] (Vasc disease, CAD, HF, female)  - Current anticoagulation includes Eliquis. - Previously on Multaq; stopped due to side effects.   - She has a contraindication to class IC antiarrhythmic drugs given the CAD.    - Receiving Sotalol 120 mg every 12 hours; initiated 2/2016.  - Reccurent PAF in December 2018 and April 2019.  - S/p DC-cardioversion April 2019.  - Presents in SR, QTc stable. - Re-education on importance of well controlled HTN (goal BP < 130/80), adequate weight control (goal BMI of < 27), physical activity consisting of moderate cardiopulmonary exercise up to a goal of 250 min/wk, smoking cessation and limited ETOH intake.     2. Sinus node dysfunction  -  Symptomatic.  - Conversion pauses < 3 seconds. - On Sotalol for PAF.  - Toprol discontinued 11/8/19 due to bradycardia. - Reports having a HR in the occasion high 30's and reports extreme fatigue. Discussed about PPM insertion  abd maximization of medical therapy versus switch medication to Tikosyn. The patient opts to proceed with pacemaker implantation and maximization of medical therapy. - Risks, benefits, and alternatives of permanent pacemaker implantation were discussed in detail today. These risks include but are not limited to bleeding, infection, blood clot, pneumothorax, hemothorax,cardiac valve damage, cardiac perforation and tamponade required emergent thoracotomy, contrast induced nephropathy leading to short or even long term dialysis, vascular injury requiring emergent surgical repair, lead dislodgement, stroke and even death. The patient understands these risks and wishes to proceed with pacemaker implantation.      3. Coronary artery disease  - S/p inferior wall MI and RCA stent in July 2015.  - On ASA and Tricor.  - Denies any recent CP.      - Follows with Dr. Portia Laureano.    Uzair Page. Chronic systolic heart failure  - Recovered LV EF.  - NYHA class I-II, ACC stage C  - Echo on 2/3/16, calculated LV EF 40.6% and visually estimated LV EF 35-40%. - Echo 7/19/19, LV EF 55%. - Not on Beta-blocker due to bradycardia. - Following with Dr Portia Laureano.      5. Obesity  - Body mass index is 32.45 kg/m². - Recommend weight loss.     6. VHD  - Mild to moderate MR and TR on TTE 7/19/19.  - Follows with Dr. Portia Laureano.      7. Sleep disturbance  - Reports poor quality sleep. - Patient wishes to speak to PCP regarding sleep apnea work up. Plan:      1. Schedule for dual chamber permanent pacemaker insertion. 2. Patient to consider switch from Sotalol to Tikosyn if she would like to hold off pacemaker insertion- will check the cost of Tikosyn. 3. Will plan for TTE for updated structure and function before the procedure. 4. Continue Sotalol 120 mg every 12 hours. 5.  Continue Eliquis 5 mg bid. Hold Eliquis for 2 doses prior to procedure. 6. Risk factor modifications as discussed above   7. EKG, BMP and Magnesium level every 6 months. 8. Follow-up in after pacemaker insertion or in 3 months. Advised patient to call the office regarding any questions or concerns. Thank you very much to allowing me to participate in the patient's care.     Yo Ortiz MD   Cardiac Electrophysiology  [facility]  [facility] [place] [place]  1/31/20   9:55 AM

## 2020-01-31 ENCOUNTER — OFFICE VISIT (OUTPATIENT)
Dept: NON INVASIVE DIAGNOSTICS | Age: 70
End: 2020-01-31
Payer: COMMERCIAL

## 2020-01-31 VITALS
SYSTOLIC BLOOD PRESSURE: 150 MMHG | RESPIRATION RATE: 18 BRPM | DIASTOLIC BLOOD PRESSURE: 78 MMHG | WEIGHT: 195 LBS | HEIGHT: 65 IN | BODY MASS INDEX: 32.49 KG/M2 | HEART RATE: 53 BPM

## 2020-01-31 PROCEDURE — 1123F ACP DISCUSS/DSCN MKR DOCD: CPT | Performed by: INTERNAL MEDICINE

## 2020-01-31 PROCEDURE — 93000 ELECTROCARDIOGRAM COMPLETE: CPT | Performed by: INTERNAL MEDICINE

## 2020-01-31 PROCEDURE — 99215 OFFICE O/P EST HI 40 MIN: CPT | Performed by: INTERNAL MEDICINE

## 2020-01-31 PROCEDURE — G8427 DOCREV CUR MEDS BY ELIG CLIN: HCPCS | Performed by: INTERNAL MEDICINE

## 2020-01-31 PROCEDURE — 1090F PRES/ABSN URINE INCON ASSESS: CPT | Performed by: INTERNAL MEDICINE

## 2020-01-31 PROCEDURE — G8484 FLU IMMUNIZE NO ADMIN: HCPCS | Performed by: INTERNAL MEDICINE

## 2020-01-31 PROCEDURE — 4040F PNEUMOC VAC/ADMIN/RCVD: CPT | Performed by: INTERNAL MEDICINE

## 2020-01-31 PROCEDURE — 1036F TOBACCO NON-USER: CPT | Performed by: INTERNAL MEDICINE

## 2020-01-31 PROCEDURE — G8417 CALC BMI ABV UP PARAM F/U: HCPCS | Performed by: INTERNAL MEDICINE

## 2020-01-31 PROCEDURE — 3017F COLORECTAL CA SCREEN DOC REV: CPT | Performed by: INTERNAL MEDICINE

## 2020-01-31 PROCEDURE — G8400 PT W/DXA NO RESULTS DOC: HCPCS | Performed by: INTERNAL MEDICINE

## 2020-02-07 ENCOUNTER — TELEPHONE (OUTPATIENT)
Dept: NON INVASIVE DIAGNOSTICS | Age: 70
End: 2020-02-07

## 2020-02-07 NOTE — TELEPHONE ENCOUNTER
Debra Nunez called and states her hematologist wants to start her on Hydroxyurea. She wants your opinion if she should wait to begin this medication after her ppm implant. Please advise.

## 2020-02-07 NOTE — TELEPHONE ENCOUNTER
I spoke to [facility] and told her that Dr. Santy Solano prefers her to start Hydroxyurea after the ppm procedure. She verbalized understanding.

## 2020-02-12 ENCOUNTER — HOSPITAL ENCOUNTER (OUTPATIENT)
Dept: CARDIOLOGY | Age: 70
Discharge: HOME | End: 2020-02-12
Payer: COMMERCIAL

## 2020-02-12 LAB
LV EF: 58 %
LVEF MODALITY: NORMAL

## 2020-02-12 PROCEDURE — 93306 TTE W/DOPPLER COMPLETE: CPT | Performed by: PSYCHIATRY & NEUROLOGY

## 2020-02-13 ENCOUNTER — TELEPHONE (OUTPATIENT)
Dept: NON INVASIVE DIAGNOSTICS | Age: 70
End: 2020-02-13

## 2020-02-13 NOTE — TELEPHONE ENCOUNTER
I spoke to [facility] and let her know that her echo was normal and will continue with ppm implant on Monday with Dr. Gem Amos. She verbalized understanding.

## 2020-02-13 NOTE — TELEPHONE ENCOUNTER
----- Message from Deepthi Mcclain MD sent at 2/13/2020 12:32 PM EST -----  Please let her know that echo showed EF 55-60%. Will proceed with plan.  Thanks.  ----- Message -----  From: Alan Naranjo Incoming Cardiology Results From hospitals  Sent: 2/13/2020   8:16 AM EST  To: Deepthi Mcclain MD

## 2020-02-17 ENCOUNTER — HOSPITAL ENCOUNTER (OUTPATIENT)
Dept: GENERAL RADIOLOGY | Age: 70
Discharge: HOME | End: 2020-02-19
Payer: COMMERCIAL

## 2020-02-17 ENCOUNTER — ANESTHESIA (OUTPATIENT)
Dept: CARDIAC CATH/INVASIVE PROCEDURES | Age: 70
End: 2020-02-17

## 2020-02-17 ENCOUNTER — HOSPITAL ENCOUNTER (OUTPATIENT)
Dept: CARDIAC CATH/INVASIVE PROCEDURES | Age: 70
Setting detail: OBSERVATION
Discharge: HOME | End: 2020-02-18
Attending: INTERNAL MEDICINE | Admitting: INTERNAL MEDICINE
Payer: COMMERCIAL

## 2020-02-17 ENCOUNTER — ANESTHESIA EVENT (OUTPATIENT)
Dept: CARDIAC CATH/INVASIVE PROCEDURES | Age: 70
End: 2020-02-17

## 2020-02-17 VITALS
OXYGEN SATURATION: 99 % | RESPIRATION RATE: 15 BRPM | SYSTOLIC BLOOD PRESSURE: 110 MMHG | DIASTOLIC BLOOD PRESSURE: 65 MMHG

## 2020-02-17 LAB
ANION GAP SERPL CALCULATED.3IONS-SCNC: 11 MMOL/L (ref 7–16)
BASOPHILS ABSOLUTE: 0.05 E9/L (ref 0–0.2)
BASOPHILS RELATIVE PERCENT: 0.5 % (ref 0–2)
BUN BLDV-MCNC: 20 MG/DL (ref 8–23)
CALCIUM SERPL-MCNC: 9.7 MG/DL (ref 8.6–10.2)
CHLORIDE BLD-SCNC: 106 MMOL/L (ref 98–107)
CO2: 27 MMOL/L (ref 22–29)
CREAT SERPL-MCNC: 0.7 MG/DL (ref 0.5–1)
EOSINOPHILS ABSOLUTE: 0.25 E9/L (ref 0.05–0.5)
EOSINOPHILS RELATIVE PERCENT: 2.7 % (ref 0–6)
GFR AFRICAN AMERICAN: >60
GFR NON-AFRICAN AMERICAN: >60 ML/MIN/1.73
GLUCOSE BLD-MCNC: 113 MG/DL (ref 74–99)
HCT VFR BLD CALC: 45.6 % (ref 34–48)
HEMOGLOBIN: 13.7 G/DL (ref 11.5–15.5)
IMMATURE GRANULOCYTES #: 0.05 E9/L
IMMATURE GRANULOCYTES %: 0.5 % (ref 0–5)
LYMPHOCYTES ABSOLUTE: 2.43 E9/L (ref 1.5–4)
LYMPHOCYTES RELATIVE PERCENT: 26.6 % (ref 20–42)
MAGNESIUM: 2 MG/DL (ref 1.6–2.6)
MCH RBC QN AUTO: 26.7 PG (ref 26–35)
MCHC RBC AUTO-ENTMCNC: 30 % (ref 32–34.5)
MCV RBC AUTO: 88.9 FL (ref 80–99.9)
MONOCYTES ABSOLUTE: 0.73 E9/L (ref 0.1–0.95)
MONOCYTES RELATIVE PERCENT: 8 % (ref 2–12)
NEUTROPHILS ABSOLUTE: 5.63 E9/L (ref 1.8–7.3)
NEUTROPHILS RELATIVE PERCENT: 61.7 % (ref 43–80)
PDW BLD-RTO: 14.6 FL (ref 11.5–15)
PLATELET # BLD: 653 E9/L (ref 130–450)
PMV BLD AUTO: 10.3 FL (ref 7–12)
POTASSIUM SERPL-SCNC: 4 MMOL/L (ref 3.5–5)
RBC # BLD: 5.13 E12/L (ref 3.5–5.5)
SODIUM BLD-SCNC: 144 MMOL/L (ref 132–146)
WBC # BLD: 9.1 E9/L (ref 4.5–11.5)

## 2020-02-17 PROCEDURE — 85025 COMPLETE CBC W/AUTO DIFF WBC: CPT

## 2020-02-17 PROCEDURE — 96365 THER/PROPH/DIAG IV INF INIT: CPT

## 2020-02-17 PROCEDURE — 6370000000 HC RX 637 (ALT 250 FOR IP): Performed by: NURSE PRACTITIONER

## 2020-02-17 PROCEDURE — 3700000000 HC ANESTHESIA ATTENDED CARE

## 2020-02-17 PROCEDURE — 2500000003 HC RX 250 WO HCPCS

## 2020-02-17 PROCEDURE — G0378 HOSPITAL OBSERVATION PER HR: HCPCS

## 2020-02-17 PROCEDURE — 2580000003 HC RX 258

## 2020-02-17 PROCEDURE — 33208 INSRT HEART PM ATRIAL & VENT: CPT | Performed by: INTERNAL MEDICINE

## 2020-02-17 PROCEDURE — 6370000000 HC RX 637 (ALT 250 FOR IP)

## 2020-02-17 PROCEDURE — 6360000002 HC RX W HCPCS

## 2020-02-17 PROCEDURE — C1898 LEAD, PMKR, OTHER THAN TRANS: HCPCS

## 2020-02-17 PROCEDURE — 2580000003 HC RX 258: Performed by: INTERNAL MEDICINE

## 2020-02-17 PROCEDURE — 2709999900 HC NON-CHARGEABLE SUPPLY

## 2020-02-17 PROCEDURE — 83735 ASSAY OF MAGNESIUM: CPT

## 2020-02-17 PROCEDURE — 6360000002 HC RX W HCPCS: Performed by: INTERNAL MEDICINE

## 2020-02-17 PROCEDURE — 93005 ELECTROCARDIOGRAM TRACING: CPT | Performed by: INTERNAL MEDICINE

## 2020-02-17 PROCEDURE — C1785 PMKR, DUAL, RATE-RESP: HCPCS

## 2020-02-17 PROCEDURE — 80048 BASIC METABOLIC PNL TOTAL CA: CPT

## 2020-02-17 PROCEDURE — 3700000001 HC ADD 15 MINUTES (ANESTHESIA)

## 2020-02-17 PROCEDURE — 36415 COLL VENOUS BLD VENIPUNCTURE: CPT

## 2020-02-17 PROCEDURE — G0379 DIRECT REFER HOSPITAL OBSERV: HCPCS

## 2020-02-17 PROCEDURE — C1894 INTRO/SHEATH, NON-LASER: HCPCS

## 2020-02-17 PROCEDURE — 71045 X-RAY EXAM CHEST 1 VIEW: CPT

## 2020-02-17 PROCEDURE — 96366 THER/PROPH/DIAG IV INF ADDON: CPT

## 2020-02-17 PROCEDURE — 6370000000 HC RX 637 (ALT 250 FOR IP): Performed by: INTERNAL MEDICINE

## 2020-02-17 RX ORDER — ASPIRIN 81 MG/1
81 TABLET ORAL DAILY
Status: DISCONTINUED | OUTPATIENT
Start: 2020-02-17 | End: 2020-02-18 | Stop reason: HOSPADM

## 2020-02-17 RX ORDER — HYDROCODONE BITARTRATE AND ACETAMINOPHEN 5; 325 MG/1; MG/1
1 TABLET ORAL EVERY 4 HOURS PRN
Status: DISCONTINUED | OUTPATIENT
Start: 2020-02-17 | End: 2020-02-18 | Stop reason: HOSPADM

## 2020-02-17 RX ORDER — ERGOCALCIFEROL 1.25 MG/1
50000 CAPSULE ORAL WEEKLY
Status: DISCONTINUED | OUTPATIENT
Start: 2020-02-17 | End: 2020-02-18 | Stop reason: HOSPADM

## 2020-02-17 RX ORDER — FENTANYL CITRATE 50 UG/ML
INJECTION, SOLUTION INTRAMUSCULAR; INTRAVENOUS PRN
Status: DISCONTINUED | OUTPATIENT
Start: 2020-02-17 | End: 2020-02-17 | Stop reason: SDUPTHER

## 2020-02-17 RX ORDER — VANCOMYCIN HYDROCHLORIDE 1 G/20ML
INJECTION, POWDER, LYOPHILIZED, FOR SOLUTION INTRAVENOUS PRN
Status: DISCONTINUED | OUTPATIENT
Start: 2020-02-17 | End: 2020-02-17 | Stop reason: SDUPTHER

## 2020-02-17 RX ORDER — MIDAZOLAM HYDROCHLORIDE 1 MG/ML
INJECTION INTRAMUSCULAR; INTRAVENOUS PRN
Status: DISCONTINUED | OUTPATIENT
Start: 2020-02-17 | End: 2020-02-17 | Stop reason: SDUPTHER

## 2020-02-17 RX ORDER — AMLODIPINE BESYLATE 2.5 MG/1
2.5 TABLET ORAL DAILY
Status: DISCONTINUED | OUTPATIENT
Start: 2020-02-17 | End: 2020-02-18 | Stop reason: HOSPADM

## 2020-02-17 RX ORDER — SODIUM CHLORIDE 0.9 % (FLUSH) 0.9 %
10 SYRINGE (ML) INJECTION PRN
Status: DISCONTINUED | OUTPATIENT
Start: 2020-02-17 | End: 2020-02-18 | Stop reason: HOSPADM

## 2020-02-17 RX ORDER — EPHEDRINE SULFATE/0.9% NACL/PF 50 MG/5 ML
SYRINGE (ML) INTRAVENOUS PRN
Status: DISCONTINUED | OUTPATIENT
Start: 2020-02-17 | End: 2020-02-17 | Stop reason: SDUPTHER

## 2020-02-17 RX ORDER — SODIUM CHLORIDE 0.9 % (FLUSH) 0.9 %
10 SYRINGE (ML) INJECTION EVERY 12 HOURS SCHEDULED
Status: DISCONTINUED | OUTPATIENT
Start: 2020-02-17 | End: 2020-02-18 | Stop reason: HOSPADM

## 2020-02-17 RX ORDER — PROPOFOL 10 MG/ML
INJECTION, EMULSION INTRAVENOUS CONTINUOUS PRN
Status: DISCONTINUED | OUTPATIENT
Start: 2020-02-17 | End: 2020-02-17 | Stop reason: SDUPTHER

## 2020-02-17 RX ORDER — PHENYLEPHRINE HYDROCHLORIDE 10 MG/ML
INJECTION INTRAVENOUS PRN
Status: DISCONTINUED | OUTPATIENT
Start: 2020-02-17 | End: 2020-02-17 | Stop reason: SDUPTHER

## 2020-02-17 RX ORDER — HYDROCODONE BITARTRATE AND ACETAMINOPHEN 5; 325 MG/1; MG/1
2 TABLET ORAL EVERY 4 HOURS PRN
Status: DISCONTINUED | OUTPATIENT
Start: 2020-02-17 | End: 2020-02-18 | Stop reason: HOSPADM

## 2020-02-17 RX ORDER — METOPROLOL SUCCINATE 25 MG/1
25 TABLET, EXTENDED RELEASE ORAL 2 TIMES DAILY
Status: DISCONTINUED | OUTPATIENT
Start: 2020-02-17 | End: 2020-02-18 | Stop reason: HOSPADM

## 2020-02-17 RX ORDER — SOTALOL HYDROCHLORIDE 120 MG/1
120 TABLET ORAL EVERY 12 HOURS
Status: DISCONTINUED | OUTPATIENT
Start: 2020-02-17 | End: 2020-02-18 | Stop reason: HOSPADM

## 2020-02-17 RX ORDER — FENOFIBRATE 54 MG/1
54 TABLET ORAL DAILY
Status: DISCONTINUED | OUTPATIENT
Start: 2020-02-17 | End: 2020-02-18 | Stop reason: HOSPADM

## 2020-02-17 RX ORDER — ACETAMINOPHEN 325 MG/1
650 TABLET ORAL EVERY 4 HOURS PRN
Status: DISCONTINUED | OUTPATIENT
Start: 2020-02-17 | End: 2020-02-18 | Stop reason: HOSPADM

## 2020-02-17 RX ORDER — SODIUM CHLORIDE 9 MG/ML
INJECTION, SOLUTION INTRAVENOUS CONTINUOUS PRN
Status: DISCONTINUED | OUTPATIENT
Start: 2020-02-17 | End: 2020-02-17 | Stop reason: SDUPTHER

## 2020-02-17 RX ORDER — ONDANSETRON 2 MG/ML
INJECTION INTRAMUSCULAR; INTRAVENOUS PRN
Status: DISCONTINUED | OUTPATIENT
Start: 2020-02-17 | End: 2020-02-17 | Stop reason: SDUPTHER

## 2020-02-17 RX ORDER — FAMOTIDINE 20 MG/1
20 TABLET, FILM COATED ORAL DAILY
Status: DISCONTINUED | OUTPATIENT
Start: 2020-02-17 | End: 2020-02-18 | Stop reason: HOSPADM

## 2020-02-17 RX ORDER — AMLODIPINE BESYLATE 2.5 MG/1
2.5 TABLET ORAL DAILY
COMMUNITY
End: 2020-06-19 | Stop reason: ALTCHOICE

## 2020-02-17 RX ADMIN — Medication 10 MG: at 08:09

## 2020-02-17 RX ADMIN — VANCOMYCIN HYDROCHLORIDE 1000 MG: 1 INJECTION, POWDER, LYOPHILIZED, FOR SOLUTION INTRAVENOUS at 07:45

## 2020-02-17 RX ADMIN — METOPROLOL SUCCINATE 25 MG: 25 TABLET, EXTENDED RELEASE ORAL at 13:50

## 2020-02-17 RX ADMIN — APIXABAN 5 MG: 5 TABLET, FILM COATED ORAL at 19:06

## 2020-02-17 RX ADMIN — Medication 10 MG: at 08:01

## 2020-02-17 RX ADMIN — PHENYLEPHRINE HYDROCHLORIDE 100 MCG: 10 INJECTION INTRAVENOUS at 07:56

## 2020-02-17 RX ADMIN — FAMOTIDINE 20 MG: 20 TABLET, FILM COATED ORAL at 13:50

## 2020-02-17 RX ADMIN — MIDAZOLAM 1 MG: 1 INJECTION INTRAMUSCULAR; INTRAVENOUS at 07:44

## 2020-02-17 RX ADMIN — PHENYLEPHRINE HYDROCHLORIDE 100 MCG: 10 INJECTION INTRAVENOUS at 07:45

## 2020-02-17 RX ADMIN — PROPOFOL 50 MCG/KG/MIN: 10 INJECTION, EMULSION INTRAVENOUS at 07:36

## 2020-02-17 RX ADMIN — AMLODIPINE BESYLATE 2.5 MG: 2.5 TABLET ORAL at 13:50

## 2020-02-17 RX ADMIN — SODIUM CHLORIDE, PRESERVATIVE FREE 10 ML: 5 INJECTION INTRAVENOUS at 23:02

## 2020-02-17 RX ADMIN — ONDANSETRON HYDROCHLORIDE 4 MG: 2 INJECTION, SOLUTION INTRAMUSCULAR; INTRAVENOUS at 07:39

## 2020-02-17 RX ADMIN — DEXTROSE MONOHYDRATE 1.5 G: 50 INJECTION, SOLUTION INTRAVENOUS at 19:02

## 2020-02-17 RX ADMIN — HYDROCODONE BITARTRATE AND ACETAMINOPHEN 1 TABLET: 5; 325 TABLET ORAL at 16:43

## 2020-02-17 RX ADMIN — METOPROLOL SUCCINATE 25 MG: 25 TABLET, EXTENDED RELEASE ORAL at 22:58

## 2020-02-17 RX ADMIN — FENOFIBRATE 54 MG: 54 TABLET ORAL at 23:13

## 2020-02-17 RX ADMIN — SODIUM CHLORIDE: 9 INJECTION, SOLUTION INTRAVENOUS at 07:28

## 2020-02-17 RX ADMIN — HYDROCODONE BITARTRATE AND ACETAMINOPHEN 1 TABLET: 5; 325 TABLET ORAL at 23:02

## 2020-02-17 RX ADMIN — FENTANYL CITRATE 50 MCG: 50 INJECTION, SOLUTION INTRAMUSCULAR; INTRAVENOUS at 07:36

## 2020-02-17 RX ADMIN — SOTALOL HYDROCHLORIDE 120 MG: 120 TABLET ORAL at 18:15

## 2020-02-17 RX ADMIN — FENOFIBRATE 54 MG: 54 TABLET ORAL at 13:50

## 2020-02-17 RX ADMIN — MIDAZOLAM 1 MG: 1 INJECTION INTRAMUSCULAR; INTRAVENOUS at 07:36

## 2020-02-17 ASSESSMENT — PAIN DESCRIPTION - ORIENTATION: ORIENTATION: LEFT

## 2020-02-17 ASSESSMENT — PULMONARY FUNCTION TESTS
PIF_VALUE: 19
PIF_VALUE: 17
PIF_VALUE: 19
PIF_VALUE: 17
PIF_VALUE: 19
PIF_VALUE: 17
PIF_VALUE: 24
PIF_VALUE: 19
PIF_VALUE: 17
PIF_VALUE: 18
PIF_VALUE: 17
PIF_VALUE: 20
PIF_VALUE: 17
PIF_VALUE: 20
PIF_VALUE: 19
PIF_VALUE: 17
PIF_VALUE: 17
PIF_VALUE: 23
PIF_VALUE: 19
PIF_VALUE: 19
PIF_VALUE: 17
PIF_VALUE: 20
PIF_VALUE: 17
PIF_VALUE: 0
PIF_VALUE: 19
PIF_VALUE: 17
PIF_VALUE: 19
PIF_VALUE: 19
PIF_VALUE: 17
PIF_VALUE: 19
PIF_VALUE: 17
PIF_VALUE: 19
PIF_VALUE: 17
PIF_VALUE: 17
PIF_VALUE: 19
PIF_VALUE: 21
PIF_VALUE: 7
PIF_VALUE: 17
PIF_VALUE: 18
PIF_VALUE: 17
PIF_VALUE: 19
PIF_VALUE: 17
PIF_VALUE: 20
PIF_VALUE: 17
PIF_VALUE: 19
PIF_VALUE: 19
PIF_VALUE: 17
PIF_VALUE: 18
PIF_VALUE: 17
PIF_VALUE: 17
PIF_VALUE: 19
PIF_VALUE: 19
PIF_VALUE: 17
PIF_VALUE: 17
PIF_VALUE: 20
PIF_VALUE: 17
PIF_VALUE: 17
PIF_VALUE: 18
PIF_VALUE: 17
PIF_VALUE: 20
PIF_VALUE: 19
PIF_VALUE: 17
PIF_VALUE: 19
PIF_VALUE: 20
PIF_VALUE: 17

## 2020-02-17 ASSESSMENT — PAIN SCALES - GENERAL
PAINLEVEL_OUTOF10: 6
PAINLEVEL_OUTOF10: 0
PAINLEVEL_OUTOF10: 6
PAINLEVEL_OUTOF10: 6

## 2020-02-17 ASSESSMENT — PAIN DESCRIPTION - DESCRIPTORS: DESCRIPTORS: DISCOMFORT;PRESSURE

## 2020-02-17 ASSESSMENT — PAIN DESCRIPTION - ONSET: ONSET: ON-GOING

## 2020-02-17 ASSESSMENT — PAIN DESCRIPTION - PROGRESSION: CLINICAL_PROGRESSION: NOT CHANGED

## 2020-02-17 ASSESSMENT — PAIN DESCRIPTION - LOCATION: LOCATION: CHEST;ARM

## 2020-02-17 ASSESSMENT — PAIN DESCRIPTION - FREQUENCY: FREQUENCY: CONTINUOUS

## 2020-02-17 ASSESSMENT — PAIN - FUNCTIONAL ASSESSMENT: PAIN_FUNCTIONAL_ASSESSMENT: ACTIVITIES ARE NOT PREVENTED

## 2020-02-17 ASSESSMENT — PAIN DESCRIPTION - PAIN TYPE: TYPE: ACUTE PAIN;SURGICAL PAIN

## 2020-02-17 NOTE — H&P
Electrophysiology Outpatient Progress Note    Cristi Medina  1950  Date of Service: 2/17/2020  Referring Provider/PCP: Felicitas Lassiter DO  Primary Electrophysiologist: Nas Leslie MD    Chief Complaint: Persistent AF on chronic Sotalol therapy    SUBJECTIVE: Cristi Medina presents to the office today for a follow -up. Since her last office visit Ms. Hayden Hankins was seen in the hospital on 11/8/19 due to visual changes. Her work up including MRI of brain did not show any ischemic infarcts. During her admission she was noted to be bradycardic with a rate of 40's and suffered a 2.2 second asymptomatic pause and her Toprol was discontinued. She states she feels overall well and denies any episodes of AF since her hospitalization. She presents today in SB with a stable QTc. She does report that her HR went from 120's down to high 30's and reports she was very fatigued. She uses a pulse oximetry to determine her HR. We discussed about the need for pacemaker insertion, so that medication can be up titrated. Also we discussed with Ms. Hayden Hankins that about changing Sotalol to Tikosyn to avoid pacemaker insertion. The patient denies any chest pain, dyspnea, palpitations, dizziness, syncope, orthopnea or paroxysmal nocturnal dyspnea.        Patient Active Problem List    Diagnosis Date Noted    TIA (transient ischemic attack) 11/06/2019    Transient visual loss of both eyes     Obesity (BMI 30.0-34.9) 09/27/2016    VHD (valvular heart disease) 09/27/2016     Overview Note:     Moderate MR       Sinus pause 02/17/2016    CAD (coronary artery disease)     Atrial fibrillation (HCC)        Current Outpatient Medications   Medication Sig Dispense Refill    vitamin D (ERGOCALCIFEROL) 28825 units CAPS capsule Take 50,000 Units by mouth once a week   0    sotalol (BETAPACE) 120 MG tablet Take 1 tablet by mouth every 12 hours 180 tablet 3    fenofibrate (TRICOR) 48 MG tablet take 1 tablet by mouth once daily hours; initiated 2/2016.  - Reccurent PAF in December 2018 and April 2019.  - S/p DC-cardioversion April 2019.  - Presents in SR, QTc stable. - Re-education on importance of well controlled HTN (goal BP < 130/80), adequate weight control (goal BMI of < 27), physical activity consisting of moderate cardiopulmonary exercise up to a goal of 250 min/wk, smoking cessation and limited ETOH intake.     2. Sinus node dysfunction  -  Symptomatic.  - Conversion pauses < 3 seconds. - On Sotalol for PAF.  - Toprol discontinued 11/8/19 due to bradycardia. - Reports having a HR in the occasion high 30's and reports extreme fatigue. Discussed about PPM insertion  abd maximization of medical therapy versus switch medication to Tikosyn. The patient opts to proceed with pacemaker implantation and maximization of medical therapy. - Risks, benefits, and alternatives of permanent pacemaker implantation were discussed in detail today. These risks include but are not limited to bleeding, infection, blood clot, pneumothorax, hemothorax,cardiac valve damage, cardiac perforation and tamponade required emergent thoracotomy, contrast induced nephropathy leading to short or even long term dialysis, vascular injury requiring emergent surgical repair, lead dislodgement, stroke and even death. The patient understands these risks and wishes to proceed with pacemaker implantation.      3. Coronary artery disease  - S/p inferior wall MI and RCA stent in July 2015.  - On ASA and Tricor.  - Denies any recent CP.      - Follows with Dr. Andrew Vargas.    Renita Jackson. Chronic systolic heart failure  - Recovered LV EF.  - NYHA class I-II, ACC stage C  - Echo on 2/3/16, calculated LV EF 40.6% and visually estimated LV EF 35-40%. - Echo 7/19/19, LV EF 55%. - Not on Beta-blocker due to bradycardia. - Following with Dr Andrew Vargas.      5. Obesity  - Body mass index is 32.45 kg/m². - Recommend weight loss.     6.  VHD  - Mild to moderate MR and TR on TTE 7/19/19.  -

## 2020-02-17 NOTE — ANESTHESIA POSTPROCEDURE EVALUATION
Department of Anesthesiology  Postprocedure Note    Patient: Elisabeth Wan  MRN: [id number]  [place]: 1950  Date of evaluation: 2/17/2020  Time:  6:21 PM     Procedure Summary     Date:  02/17/20 Room / Location:  [facility] CATH LAB    Anesthesia Start:  2832 Anesthesia Stop:  0003    Procedure:  PACEMAKER IMPLANT W/ANES Diagnosis:  Sinus node dysfunction ([address]    Scheduled Providers:  DONALD Jimenes - CRNA; Pavithra Arevalo DO Responsible Provider:  Pavithra Arevalo DO    Anesthesia Type:  MAC ASA Status:  3          Anesthesia Type: MAC    Mainor Phase I:      Mainor Phase II:      Last vitals: Reviewed and per EMR flowsheets.        Anesthesia Post Evaluation    Patient location during evaluation: bedside  Patient participation: complete - patient cannot participate  Level of consciousness: awake and alert  Airway patency: patent  Nausea & Vomiting: no nausea and no vomiting  Complications: no  Cardiovascular status: blood pressure returned to baseline  Respiratory status: acceptable  Hydration status: euvolemic

## 2020-02-17 NOTE — ANESTHESIA PRE PROCEDURE
[Penicillins]     Sulfa Antibiotics     Trimethoprim        Problem List:    Patient Active Problem List   Diagnosis Code    CAD (coronary artery disease) I25.10    Atrial fibrillation (HCC) I48.91    Sinus pause I45.5    Obesity (BMI 30.0-34. 9) E66.9    VHD (valvular heart disease) I38    TIA (transient ischemic attack) G45.9    Transient visual loss of both eyes H53.123       Past Medical History:        Diagnosis Date    [facility]est[facility] 2016    Asthma     [facility] fibrillation [facility]     CAD (coronary artery disease)     GERD (gastroesophageal reflux disease)     Hiatal hernia     Macular degeneration     MI (myocardial infarction) ([address]     Obesity     S/P angioplasty with stent     Syncopal episodes     Weakness        Past Surgical History:        Procedure Laterality Date    CARDIOVERSION  2016    CHOLECYSTECTOMY      COLONOSCOPY      CORONARY ANGIOPLASTY WITH STENT PLACEMENT  2015    ENDOSCOPY, COLON, DIAGNOSTIC      TUBAL LIGATION      VARICOSE VEIN SURGERY         Social History:    Social History     Tobacco Use    Smoking status: Former Smoker     Packs/day: 1.00     Years: 40.00     Pack years: 40.00     Last attempt to quit: 2015     Years since quittin.6    Smokeless tobacco: Never Used   Substance Use Topics    Alcohol use:  No                                Counseling given: Not Answered      Vital Signs (Current):   Vitals:    20 0653   BP: (!) 132/49   Pulse: 59   Resp: 18   Temp: 97.8 °F (36.6 °C)   TempSrc: Temporal   SpO2: 96%   Weight: 195 lb (88.5 kg)   Height: 5' 5\" (1.651 m)                                              BP Readings from Last 3 Encounters:   20 (!) 132/49   20 (!) 150/78   19 (!) 152/74       NPO Status:  > 8hrs                                                                               BMI:   Wt Readings from Last 3 Encounters:   20 195 lb (88.5 kg) 01/31/20 195 lb (88.5 kg)   11/08/19 190 lb 2 oz (86.2 kg)     Body mass index is 32.45 kg/m². CBC:   Lab Results   Component Value Date    WBC 9.1 02/17/2020    RBC 5.13 02/17/2020    HGB 13.7 02/17/2020    HCT 45.6 02/17/2020    MCV 88.9 02/17/2020    RDW 14.6 02/17/2020     02/17/2020       CMP:   Lab Results   Component Value Date     12/27/2019    K 4.0 12/27/2019    K 4.0 11/07/2019     12/27/2019    CO2 30 12/27/2019    BUN 13 12/27/2019    CREATININE 0.64 12/27/2019    GFRAA > 60 12/27/2019    LABGLOM >60 12/27/2019    GLUCOSE NEGATIVE 12/27/2019    PROT 6.7 12/27/2019    CALCIUM 8.6 12/27/2019    BILITOT NEGATIVE 12/27/2019    ALKPHOS 78 12/27/2019    AST 40 12/27/2019    ALT 40 12/27/2019       POC Tests: No results for input(s): POCGLU, POCNA, POCK, POCCL, POCBUN, POCHEMO, POCHCT in the last 72 hours. Coags:   Lab Results   Component Value Date    PROTIME 10.9 12/27/2019    INR 1.0 12/27/2019    APTT 28.7 12/27/2019       HCG (If Applicable): No results found for: PREGTESTUR, PREGSERUM, HCG, HCGQUANT     ABGs: No results found for: PHART, PO2ART, JCG3IYS, REO3FZI, BEART, T9XGMIVB     Type & Screen (If Applicable):  No results found for: LABABO, [address]    Anesthesia Evaluation  Patient summary reviewed   history of anesthetic complications: PONV. Airway: Mallampati: II  TM distance: >3 FB   Neck ROM: full  Mouth opening: > = 3 FB Dental:    (+) lower dentures and upper dentures      Pulmonary:normal exam  breath sounds clear to auscultation  (+) asthma:                            Cardiovascular:    (+) past MI:, CAD:, CABG/stent:, CHF: diastolic,         Rhythm: regular  Rate: normal                 ROS comment:  Normal left ventricular chamber size. Normal left ventricular systolic function, LVEF is 55-60 %. Normal diastolic function. Left atrium is borderline enlarged. Interatrial septum not well visualized but appears intact.    Normal right ventricle sized and function. There is mild tricuspid regurgitation, ERO 0.1cm2, PISA 0.5cm, RV 19cc. No mitral valve prolapse. There is mild tricuspid regurgitation, RVSP 35mmHg . Normal aortic root size. No evidence of pericardial effusion. Pericardium appears normal.   No intracardiac mass. Compared to prior study from 3/2016 - EF is better, MR and TR are less. Neuro/Psych:   (+) TIA,             GI/Hepatic/Renal:   (+) hiatal hernia, GERD:,           Endo/Other: Negative Endo/Other ROS                    Abdominal:           Vascular:                                        Anesthesia Plan      MAC     ASA 3       Induction: intravenous. Anesthetic plan and risks discussed with patient. Plan discussed with CRNA.                   Gaby Vanegas DO   2/17/2020

## 2020-02-17 NOTE — PROGRESS NOTES
ARRHYTHMIA EDUCATION     Met with patient to review information relating to Sinus Node Dysfunction (SND) & PPM Insertion    Discussed the following topics: The Heart's Electrical System, SND, PPM, Post Operative Care of PPM, & PPM DC Instructions     Handouts given on above topics given & questions answered. Patient verbalized understanding as evidenced by \"teach back\". Time spent with patient: 15 minutes.

## 2020-02-18 ENCOUNTER — APPOINTMENT (OUTPATIENT)
Dept: GENERAL RADIOLOGY | Age: 70
End: 2020-02-18
Attending: INTERNAL MEDICINE
Payer: COMMERCIAL

## 2020-02-18 VITALS
BODY MASS INDEX: 32.49 KG/M2 | TEMPERATURE: 98.1 F | HEART RATE: 73 BPM | HEIGHT: 65 IN | DIASTOLIC BLOOD PRESSURE: 59 MMHG | RESPIRATION RATE: 18 BRPM | OXYGEN SATURATION: 96 % | SYSTOLIC BLOOD PRESSURE: 126 MMHG | WEIGHT: 195 LBS

## 2020-02-18 PROBLEM — Z95.0 PACEMAKER: Status: ACTIVE | Noted: 2020-02-18

## 2020-02-18 LAB
ANION GAP SERPL CALCULATED.3IONS-SCNC: 10 MMOL/L (ref 7–16)
BUN BLDV-MCNC: 15 MG/DL (ref 8–23)
CALCIUM SERPL-MCNC: 9.1 MG/DL (ref 8.6–10.2)
CHLORIDE BLD-SCNC: 100 MMOL/L (ref 98–107)
CO2: 23 MMOL/L (ref 22–29)
CREAT SERPL-MCNC: 0.7 MG/DL (ref 0.5–1)
GFR AFRICAN AMERICAN: >60
GFR NON-AFRICAN AMERICAN: >60 ML/MIN/1.73
GLUCOSE BLD-MCNC: 93 MG/DL (ref 74–99)
HCT VFR BLD CALC: 46.7 % (ref 34–48)
HEMOGLOBIN: 13.9 G/DL (ref 11.5–15.5)
MAGNESIUM: 1.9 MG/DL (ref 1.6–2.6)
MCH RBC QN AUTO: 26.9 PG (ref 26–35)
MCHC RBC AUTO-ENTMCNC: 29.8 % (ref 32–34.5)
MCV RBC AUTO: 90.5 FL (ref 80–99.9)
PDW BLD-RTO: 14.8 FL (ref 11.5–15)
PLATELET # BLD: 571 E9/L (ref 130–450)
PMV BLD AUTO: 10.4 FL (ref 7–12)
POTASSIUM SERPL-SCNC: 3.8 MMOL/L (ref 3.5–5)
RBC # BLD: 5.16 E12/L (ref 3.5–5.5)
SODIUM BLD-SCNC: 133 MMOL/L (ref 132–146)
WBC # BLD: 8.1 E9/L (ref 4.5–11.5)

## 2020-02-18 PROCEDURE — 6370000000 HC RX 637 (ALT 250 FOR IP): Performed by: INTERNAL MEDICINE

## 2020-02-18 PROCEDURE — G0378 HOSPITAL OBSERVATION PER HR: HCPCS

## 2020-02-18 PROCEDURE — 36415 COLL VENOUS BLD VENIPUNCTURE: CPT

## 2020-02-18 PROCEDURE — 85027 COMPLETE CBC AUTOMATED: CPT

## 2020-02-18 PROCEDURE — 83735 ASSAY OF MAGNESIUM: CPT

## 2020-02-18 PROCEDURE — 71045 X-RAY EXAM CHEST 1 VIEW: CPT

## 2020-02-18 PROCEDURE — 80048 BASIC METABOLIC PNL TOTAL CA: CPT

## 2020-02-18 PROCEDURE — 93280 PM DEVICE PROGR EVAL DUAL: CPT | Performed by: INTERNAL MEDICINE

## 2020-02-18 PROCEDURE — 2580000003 HC RX 258: Performed by: INTERNAL MEDICINE

## 2020-02-18 PROCEDURE — 6370000000 HC RX 637 (ALT 250 FOR IP): Performed by: NURSE PRACTITIONER

## 2020-02-18 PROCEDURE — 99217 PR OBSERVATION CARE DISCHARGE MANAGEMENT: CPT | Performed by: INTERNAL MEDICINE

## 2020-02-18 RX ORDER — METOPROLOL SUCCINATE 25 MG/1
25 TABLET, EXTENDED RELEASE ORAL 2 TIMES DAILY
Qty: 30 TABLET | Refills: 3 | Status: SHIPPED | OUTPATIENT
Start: 2020-02-18 | End: 2020-06-19 | Stop reason: SDUPTHER

## 2020-02-18 RX ADMIN — FAMOTIDINE 20 MG: 20 TABLET, FILM COATED ORAL at 08:21

## 2020-02-18 RX ADMIN — AMLODIPINE BESYLATE 2.5 MG: 2.5 TABLET ORAL at 08:21

## 2020-02-18 RX ADMIN — METOPROLOL SUCCINATE 25 MG: 25 TABLET, EXTENDED RELEASE ORAL at 08:21

## 2020-02-18 RX ADMIN — SODIUM CHLORIDE, PRESERVATIVE FREE 10 ML: 5 INJECTION INTRAVENOUS at 08:21

## 2020-02-18 RX ADMIN — FENOFIBRATE 54 MG: 54 TABLET ORAL at 08:21

## 2020-02-18 RX ADMIN — ASPIRIN 81 MG: 81 TABLET, COATED ORAL at 08:21

## 2020-02-18 RX ADMIN — APIXABAN 5 MG: 5 TABLET, FILM COATED ORAL at 06:10

## 2020-02-18 RX ADMIN — SOTALOL HYDROCHLORIDE 120 MG: 120 TABLET ORAL at 06:09

## 2020-02-18 RX ADMIN — HYDROCODONE BITARTRATE AND ACETAMINOPHEN 1 TABLET: 5; 325 TABLET ORAL at 08:21

## 2020-02-18 ASSESSMENT — PAIN DESCRIPTION - ORIENTATION: ORIENTATION: LEFT

## 2020-02-18 ASSESSMENT — PAIN DESCRIPTION - PROGRESSION: CLINICAL_PROGRESSION: NOT CHANGED

## 2020-02-18 ASSESSMENT — PAIN SCALES - GENERAL
PAINLEVEL_OUTOF10: 6
PAINLEVEL_OUTOF10: 0

## 2020-02-18 ASSESSMENT — PAIN DESCRIPTION - LOCATION: LOCATION: CHEST;ARM

## 2020-02-18 ASSESSMENT — PAIN DESCRIPTION - PAIN TYPE: TYPE: ACUTE PAIN;SURGICAL PAIN

## 2020-02-18 ASSESSMENT — PAIN DESCRIPTION - FREQUENCY: FREQUENCY: CONTINUOUS

## 2020-02-18 ASSESSMENT — PAIN DESCRIPTION - DESCRIPTORS: DESCRIPTORS: ACHING;DISCOMFORT;PRESSURE

## 2020-02-18 ASSESSMENT — PAIN DESCRIPTION - ONSET: ONSET: ON-GOING

## 2020-02-18 ASSESSMENT — PAIN - FUNCTIONAL ASSESSMENT: PAIN_FUNCTIONAL_ASSESSMENT: ACTIVITIES ARE NOT PREVENTED

## 2020-02-18 NOTE — PROGRESS NOTES
Reviewed discharge paperwork with patient at bedside. New medications reviewed, as well as follow-up appointments. RN explained discharge instructions regarding incision care. Patient verbalized understanding.

## 2020-02-18 NOTE — PROGRESS NOTES
[facility] Cardiac Electrophysiology    Device Interrogation/Reprogramming  Make/Model Medtronic [place] Xt   Mode AAIR--> DDDR 60/130 ppm  P wave: 2.3 mV  Impedance: 532 ohms   Threshold: 0.75 V @ 0.4 ms  RV R wave: 11.1 mV  Impedance: 817 ohms   Threshold: 0.5 V @ 0.4 ms  Pacing: A: 65.7%  RV: <0.1%   Battery Voltage/Longevity:  3.13 V    Arrhythmias: none     Reprogramming included: as needed     Overall device function is normal  All device programmable settings were evaluated per above and in the scanned document, along with iterative adjustments (capture thresholds) to assess and select the most appropriate final programming to provide for consistent delivery of the appropriate therapy and to verify function of the device. Device site: C/D/I, AquaCel dressing intact. No hematoma. Recommendations:    1. Post-operative instructions given. 2. OK for discharge. 3. Follow-up for an incision check in the office in 10-14 days.     Discussed with  Brad Muir DO  Electronically signed by DONALD Bruno CNP on 2/18/2020 at [facility]

## 2020-02-20 LAB
EKG ATRIAL RATE: 63 BPM
EKG P AXIS: -25 DEGREES
EKG P-R INTERVAL: 236 MS
EKG Q-T INTERVAL: 498 MS
EKG QRS DURATION: 86 MS
EKG QTC CALCULATION (BAZETT): 509 MS
EKG R AXIS: 26 DEGREES
EKG T AXIS: -8 DEGREES
EKG VENTRICULAR RATE: 63 BPM

## 2020-02-21 ENCOUNTER — TELEPHONE (OUTPATIENT)
Dept: CARDIOLOGY | Age: 70
End: 2020-02-21

## 2020-02-21 NOTE — TELEPHONE ENCOUNTER
I called Shane Plascencia see how she's doing since her PPM insertion on 2/17/20. She states she's doing fine & that the aquacel dressing remains intact. She denies any fevers, redness, bleeding, drainage, or swelling from PPM incision site. I reminded her to remove the aquacel dsg on Monday 2/24/20 & to continue to wear her sling at night for and not to abduct her L arm above the shoulder for 4 weeks. She's also aware of her follow up appt at the [address] [place] [address] Drive on 3/3/20 at 1;30 pm. She offers no complaints & is thankful for the phone call.

## 2020-02-28 ENCOUNTER — TELEPHONE (OUTPATIENT)
Dept: NON INVASIVE DIAGNOSTICS | Age: 70
End: 2020-02-28

## 2020-03-03 ENCOUNTER — NURSE ONLY (OUTPATIENT)
Dept: NON INVASIVE DIAGNOSTICS | Age: 70
End: 2020-03-03
Payer: COMMERCIAL

## 2020-03-03 PROCEDURE — 93283 PRGRMG EVAL IMPLANTABLE DFB: CPT | Performed by: INTERNAL MEDICINE

## 2020-03-04 ENCOUNTER — TELEPHONE (OUTPATIENT)
Dept: NON INVASIVE DIAGNOSTICS | Age: 70
End: 2020-03-04

## 2020-03-04 NOTE — TELEPHONE ENCOUNTER
----- Message from Jodell Goltz, MD sent at 3/3/2020  4:29 PM EST -----  Yes she can. Thanks.  ----- Message -----  From: Yeimy Dixon RN  Sent: 3/3/2020   4:12 PM EST  To: Jodell Goltz, MD    Patient is questioning if it is ok to start on hydroxyurea. Per telephone notes you wanted her wait until after he PPM implant. She is now 2 weeks out.  Thanks

## 2020-05-22 ENCOUNTER — NURSE ONLY (OUTPATIENT)
Dept: NON INVASIVE DIAGNOSTICS | Age: 70
End: 2020-05-22
Payer: COMMERCIAL

## 2020-05-22 PROCEDURE — 93296 REM INTERROG EVL PM/IDS: CPT | Performed by: INTERNAL MEDICINE

## 2020-05-22 PROCEDURE — 93294 REM INTERROG EVL PM/LDLS PM: CPT | Performed by: INTERNAL MEDICINE

## 2020-05-22 NOTE — PROGRESS NOTES
See [employer] [place] report. Remote monitoring reviewed over a 90 day period.   End of 90 day monitoring period date of service 05/22/2020

## 2020-06-05 ENCOUNTER — TELEPHONE (OUTPATIENT)
Dept: NON INVASIVE DIAGNOSTICS | Age: 70
End: 2020-06-05

## 2020-06-05 NOTE — TELEPHONE ENCOUNTER
[facility] alert for AF ongoing since 6-4-2020 at 1820. Also 1 NSVT, 9 beats, v-rate 162 bpm. On Sotalol, Toprol and Eliquis. Pt called our office stating she thinks she went into AF last night. Sending another tx. Called pt (no second tx). Pt states she knows when she is in AF, she feels palpitations and also has a pulse ox at home. Confirmed AF finding on remote monitoring. Pt asking if she should go to ER. Instructed no need to go to ER, only if severe symptoms including dizziness/syncope. She verbalized understanding. Will try to send another remote. Also scheduled for overdue 3 month visit in device clinic for next week. Pt states she thought our office was closed, so she didn't come. Monitor reset and now working after further instruction, phone call back to patient who states she is relieved. HR's 70 - 110 bpm during AF. Will review with Dr. Bianca Farmer.      Lesle Mcburney RN, BSN  [facility]

## 2020-06-05 NOTE — TELEPHONE ENCOUNTER
Has in office check with device clinic on 6-, will check then and follow Dr. Marsha Rodrigues advice.

## 2020-06-05 NOTE — TELEPHONE ENCOUNTER
Please check remote next week if she remains in AF and not feeling well, please schedule her for CV if she has not forget taking any Eliquis for the past 3 weeks. Thanks.

## 2020-06-11 ENCOUNTER — NURSE ONLY (OUTPATIENT)
Dept: NON INVASIVE DIAGNOSTICS | Age: 70
End: 2020-06-11
Payer: COMMERCIAL

## 2020-06-11 VITALS — TEMPERATURE: 97.2 F

## 2020-06-11 PROCEDURE — 93280 PM DEVICE PROGR EVAL DUAL: CPT | Performed by: INTERNAL MEDICINE

## 2020-06-12 ENCOUNTER — TELEPHONE (OUTPATIENT)
Dept: NON INVASIVE DIAGNOSTICS | Age: 70
End: 2020-06-12

## 2020-06-12 NOTE — TELEPHONE ENCOUNTER
Spoke with patient let her know CV is scheduled on 06/19/2020 at 12:30 with [facility] at [facility]. Patient is to arrive at 10:30, nothing to eat or drink after midnight. Patient is to have a  and both are required to wear a mask. Patient is to also get COVID tested on 06/15/2020 and stay isolated until day of procedure. Patient verbalized understanding.

## 2020-06-16 ENCOUNTER — HOSPITAL ENCOUNTER (OUTPATIENT)
Age: 70
Discharge: HOME | End: 2020-06-18
Payer: COMMERCIAL

## 2020-06-16 PROCEDURE — U0003 INFECTIOUS AGENT DETECTION BY NUCLEIC ACID (DNA OR RNA); SEVERE ACUTE RESPIRATORY SYNDROME CORONAVIRUS 2 (SARS-COV-2) (CORONAVIRUS DISEASE [COVID-19]), AMPLIFIED PROBE TECHNIQUE, MAKING USE OF HIGH THROUGHPUT TECHNOLOGIES AS DESCRIBED BY CMS-2020-01-R: HCPCS

## 2020-06-19 ENCOUNTER — HOSPITAL ENCOUNTER (OUTPATIENT)
Dept: CARDIAC CATH/INVASIVE PROCEDURES | Age: 70
Discharge: HOME | End: 2020-06-19
Payer: COMMERCIAL

## 2020-06-19 ENCOUNTER — ANESTHESIA EVENT (OUTPATIENT)
Dept: CARDIAC CATH/INVASIVE PROCEDURES | Age: 70
End: 2020-06-19

## 2020-06-19 ENCOUNTER — ANESTHESIA (OUTPATIENT)
Dept: CARDIAC CATH/INVASIVE PROCEDURES | Age: 70
End: 2020-06-19

## 2020-06-19 VITALS — HEIGHT: 66 IN | BODY MASS INDEX: 31.34 KG/M2 | WEIGHT: 195 LBS

## 2020-06-19 VITALS
OXYGEN SATURATION: 97 % | RESPIRATION RATE: 14 BRPM | SYSTOLIC BLOOD PRESSURE: 97 MMHG | DIASTOLIC BLOOD PRESSURE: 63 MMHG

## 2020-06-19 LAB
ANION GAP SERPL CALCULATED.3IONS-SCNC: 11 MMOL/L (ref 7–16)
BASOPHILS ABSOLUTE: 0.05 E9/L (ref 0–0.2)
BASOPHILS RELATIVE PERCENT: 0.6 % (ref 0–2)
BUN BLDV-MCNC: 18 MG/DL (ref 8–23)
CALCIUM SERPL-MCNC: 9.2 MG/DL (ref 8.6–10.2)
CHLORIDE BLD-SCNC: 106 MMOL/L (ref 98–107)
CO2: 25 MMOL/L (ref 22–29)
CREAT SERPL-MCNC: 0.8 MG/DL (ref 0.5–1)
EKG ATRIAL RATE: 220 BPM
EKG ATRIAL RATE: 70 BPM
EKG P-R INTERVAL: 220 MS
EKG Q-T INTERVAL: 382 MS
EKG Q-T INTERVAL: 450 MS
EKG QRS DURATION: 80 MS
EKG QRS DURATION: 88 MS
EKG QTC CALCULATION (BAZETT): 486 MS
EKG QTC CALCULATION (BAZETT): 502 MS
EKG R AXIS: 24 DEGREES
EKG R AXIS: 6 DEGREES
EKG T AXIS: -14 DEGREES
EKG T AXIS: -3 DEGREES
EKG VENTRICULAR RATE: 104 BPM
EKG VENTRICULAR RATE: 70 BPM
EOSINOPHILS ABSOLUTE: 0.19 E9/L (ref 0.05–0.5)
EOSINOPHILS RELATIVE PERCENT: 2.4 % (ref 0–6)
GFR AFRICAN AMERICAN: >60
GFR NON-AFRICAN AMERICAN: >60 ML/MIN/1.73
GLUCOSE BLD-MCNC: 104 MG/DL (ref 74–99)
HCT VFR BLD CALC: 42.2 % (ref 34–48)
HEMOGLOBIN: 13.5 G/DL (ref 11.5–15.5)
IMMATURE GRANULOCYTES #: 0.07 E9/L
IMMATURE GRANULOCYTES %: 0.9 % (ref 0–5)
LYMPHOCYTES ABSOLUTE: 2.27 E9/L (ref 1.5–4)
LYMPHOCYTES RELATIVE PERCENT: 28.6 % (ref 20–42)
MAGNESIUM: 2 MG/DL (ref 1.6–2.6)
MCH RBC QN AUTO: 31.2 PG (ref 26–35)
MCHC RBC AUTO-ENTMCNC: 32 % (ref 32–34.5)
MCV RBC AUTO: 97.5 FL (ref 80–99.9)
MONOCYTES ABSOLUTE: 0.58 E9/L (ref 0.1–0.95)
MONOCYTES RELATIVE PERCENT: 7.3 % (ref 2–12)
NEUTROPHILS ABSOLUTE: 4.78 E9/L (ref 1.8–7.3)
NEUTROPHILS RELATIVE PERCENT: 60.2 % (ref 43–80)
PDW BLD-RTO: 16.6 FL (ref 11.5–15)
PLATELET # BLD: 468 E9/L (ref 130–450)
PMV BLD AUTO: 10 FL (ref 7–12)
POTASSIUM SERPL-SCNC: 4.5 MMOL/L (ref 3.5–5)
RBC # BLD: 4.33 E12/L (ref 3.5–5.5)
SARS-COV-2: NOT DETECTED
SODIUM BLD-SCNC: 142 MMOL/L (ref 132–146)
SOURCE: NORMAL
WBC # BLD: 7.9 E9/L (ref 4.5–11.5)

## 2020-06-19 PROCEDURE — 80048 BASIC METABOLIC PNL TOTAL CA: CPT

## 2020-06-19 PROCEDURE — 36415 COLL VENOUS BLD VENIPUNCTURE: CPT

## 2020-06-19 PROCEDURE — 3700000001 HC ADD 15 MINUTES (ANESTHESIA)

## 2020-06-19 PROCEDURE — 93288 INTERROG EVL PM/LDLS PM IP: CPT | Performed by: INTERNAL MEDICINE

## 2020-06-19 PROCEDURE — 2709999900 HC NON-CHARGEABLE SUPPLY

## 2020-06-19 PROCEDURE — 85025 COMPLETE CBC W/AUTO DIFF WBC: CPT

## 2020-06-19 PROCEDURE — 3700000000 HC ANESTHESIA ATTENDED CARE

## 2020-06-19 PROCEDURE — 2580000003 HC RX 258: Performed by: NURSE ANESTHETIST, CERTIFIED REGISTERED

## 2020-06-19 PROCEDURE — 6360000002 HC RX W HCPCS: Performed by: NURSE ANESTHETIST, CERTIFIED REGISTERED

## 2020-06-19 PROCEDURE — 92960 CARDIOVERSION ELECTRIC EXT: CPT | Performed by: INTERNAL MEDICINE

## 2020-06-19 PROCEDURE — 93010 ELECTROCARDIOGRAM REPORT: CPT | Performed by: INTERNAL MEDICINE

## 2020-06-19 PROCEDURE — 2580000003 HC RX 258: Performed by: INTERNAL MEDICINE

## 2020-06-19 PROCEDURE — 93005 ELECTROCARDIOGRAM TRACING: CPT | Performed by: INTERNAL MEDICINE

## 2020-06-19 PROCEDURE — 83735 ASSAY OF MAGNESIUM: CPT

## 2020-06-19 RX ORDER — SODIUM CHLORIDE 0.9 % (FLUSH) 0.9 %
10 SYRINGE (ML) INJECTION EVERY 12 HOURS SCHEDULED
Status: CANCELLED | OUTPATIENT
Start: 2020-06-19

## 2020-06-19 RX ORDER — SODIUM CHLORIDE 9 MG/ML
INJECTION, SOLUTION INTRAVENOUS ONCE
Status: COMPLETED | OUTPATIENT
Start: 2020-06-19 | End: 2020-06-19

## 2020-06-19 RX ORDER — SODIUM CHLORIDE 0.9 % (FLUSH) 0.9 %
10 SYRINGE (ML) INJECTION PRN
Status: CANCELLED | OUTPATIENT
Start: 2020-06-19

## 2020-06-19 RX ORDER — SODIUM CHLORIDE 9 MG/ML
INJECTION, SOLUTION INTRAVENOUS CONTINUOUS PRN
Status: DISCONTINUED | OUTPATIENT
Start: 2020-06-19 | End: 2020-06-19 | Stop reason: SDUPTHER

## 2020-06-19 RX ORDER — PROPOFOL 10 MG/ML
INJECTION, EMULSION INTRAVENOUS PRN
Status: DISCONTINUED | OUTPATIENT
Start: 2020-06-19 | End: 2020-06-19 | Stop reason: SDUPTHER

## 2020-06-19 RX ORDER — METOPROLOL SUCCINATE 50 MG/1
50 TABLET, EXTENDED RELEASE ORAL 2 TIMES DAILY
Qty: 60 TABLET | Refills: 3 | Status: SHIPPED | OUTPATIENT
Start: 2020-06-19 | End: 2020-10-09

## 2020-06-19 RX ADMIN — SODIUM CHLORIDE: 9 INJECTION, SOLUTION INTRAVENOUS at 10:37

## 2020-06-19 RX ADMIN — PROPOFOL 90 MG: 10 INJECTION, EMULSION INTRAVENOUS at 11:05

## 2020-06-19 RX ADMIN — SODIUM CHLORIDE: 9 INJECTION, SOLUTION INTRAVENOUS at 11:00

## 2020-06-19 ASSESSMENT — LIFESTYLE VARIABLES: SMOKING_STATUS: 0

## 2020-06-19 NOTE — H&P
Electrophysiology Outpatient Progress Note    Rachel Roman  1950  Date of Service: 6/19/2020  Referring Provider/PCP: Brant Kelley DO  Primary Electrophysiologist: Gaby Beasley MD    Chief Complaint: Persistent AF on chronic Sotalol therapy    SUBJECTIVE: Rachel Roman presents to the office today for a follow -up. Since her last office visit Ms. Sheila Reich was seen in the hospital on 11/8/19 due to visual changes. Her work up including MRI of brain did not show any ischemic infarcts. During her admission she was noted to be bradycardic with a rate of 40's and suffered a 2.2 second asymptomatic pause and her Toprol was discontinued. She states she feels overall well and denies any episodes of AF since her hospitalization. She presents today in SB with a stable QTc. She does report that her HR went from 120's down to high 30's and reports she was very fatigued. She uses a pulse oximetry to determine her HR. We discussed about the need for pacemaker insertion, so that medication can be up titrated. Also we discussed with Ms. Sheila Reich that about changing Sotalol to Tikosyn to avoid pacemaker insertion. The patient denies any chest pain, dyspnea, palpitations, dizziness, syncope, orthopnea or paroxysmal nocturnal dyspnea.        Patient Active Problem List    Diagnosis Date Noted    Pacemaker 02/18/2020     Overview Note:     Medtronic       TIA (transient ischemic attack) 11/06/2019    Transient visual loss of both eyes     Obesity (BMI 30.0-34.9) 09/27/2016    VHD (valvular heart disease) 09/27/2016     Overview Note:     Moderate MR       Sinus node dysfunction (HCC) 02/17/2016    CAD (coronary artery disease)     Atrial fibrillation (HCC)        Current Outpatient Medications   Medication Sig Dispense Refill    metoprolol succinate (TOPROL XL) 25 MG extended release tablet Take 1 tablet by mouth 2 times daily 30 tablet 3    amLODIPine (NORVASC) 2.5 MG tablet Take 2.5 mg by mouth daily effects.   - She has a contraindication to class IC antiarrhythmic drugs given the CAD. - Receiving Sotalol 120 mg every 12 hours; initiated 2/2016.  - Reccurent PAF in December 2018 and April 2019.  - S/p DC-cardioversion April 2019.  - Presents in SR, QTc stable. - Re-education on importance of well controlled HTN (goal BP < 130/80), adequate weight control (goal BMI of < 27), physical activity consisting of moderate cardiopulmonary exercise up to a goal of 250 min/wk, smoking cessation and limited ETOH intake.     2. Sinus node dysfunction  -  Symptomatic.  - Conversion pauses < 3 seconds. - On Sotalol for PAF.  - Toprol discontinued 11/8/19 due to bradycardia. - Reports having a HR in the occasion high 30's and reports extreme fatigue. Discussed about PPM insertion  abd maximization of medical therapy versus switch medication to Tikosyn. The patient opts to proceed with pacemaker implantation and maximization of medical therapy. - Risks, benefits, and alternatives of permanent pacemaker implantation were discussed in detail today. These risks include but are not limited to bleeding, infection, blood clot, pneumothorax, hemothorax,cardiac valve damage, cardiac perforation and tamponade required emergent thoracotomy, contrast induced nephropathy leading to short or even long term dialysis, vascular injury requiring emergent surgical repair, lead dislodgement, stroke and even death. The patient understands these risks and wishes to proceed with pacemaker implantation.      3. Coronary artery disease  - S/p inferior wall MI and RCA stent in July 2015.  - On ASA and Tricor.  - Denies any recent CP.      - Follows with Dr. Latoya Kaye.    Harrison Malloy. Chronic systolic heart failure  - Recovered LV EF.  - NYHA class I-II, ACC stage C  - Echo on 2/3/16, calculated LV EF 40.6% and visually estimated LV EF 35-40%. - Echo 7/19/19, LV EF 55%. - Not on Beta-blocker due to bradycardia. - Following with Dr Latoya Kaye.      Jose. Obesity  - There is no height or weight on file to calculate BMI. - Recommend weight loss.     6. VHD  - Mild to moderate MR and TR on TTE 7/19/19.  - Follows with Dr. Stacy Sotomayor. 7. Sleep disturbance  - Reports poor quality sleep. - Patient wishes to speak to PCP regarding sleep apnea work up. Plan:      1. Schedule for dual chamber permanent pacemaker insertion. 2. Patient to consider switch from Sotalol to Tikosyn if she would like to hold off pacemaker insertion- will check the cost of Tikosyn. 3. Will plan for TTE for updated structure and function before the procedure. 4. Continue Sotalol 120 mg every 12 hours. 5.  Continue Eliquis 5 mg bid. Hold Eliquis for 2 doses prior to procedure. 6. Risk factor modifications as discussed above   7. EKG, BMP and Magnesium level every 6 months. 8. Follow-up in after pacemaker insertion or in 3 months. Advised patient to call the office regarding any questions or concerns. Thank you very much to allowing me to participate in the patient's care. Geovani Muir MD   Cardiac Electrophysiology  [facility]) Physicians  [facility] [place]: [facility]  1/31/20   9:44 AM      Addendum:    Note reviewed. No change has been made. The patient seen and examined. She underwent successful dual chamber pacemaker implantation on 2/17/20. Since then she has been doing fairly well. She reported palpitations on 6/5/20. Remote pacemaker interrogation showed recurrent atrial fibrillation, She states that she has not forgot taking Eliquis for the past 3 weeks. Risks, benefits and alternatives to DC-cardioversion were discussed. The patient verbalizes understanding and agrees to proceed with the procedure.     Geovani Muir MD  Cardiac Electrophysiology  [address]  [facility] [place]  9:44 AM  6/19/2020

## 2020-06-19 NOTE — ANESTHESIA PRE PROCEDURE
Answered      Vital Signs (Current): There were no vitals filed for this visit. BP Readings from Last 3 Encounters:   02/18/20 (!) 126/59   02/17/20 110/65   01/31/20 (!) 150/78       NPO Status:  sips water this am with meds, solids >8 hrs                                                                               BMI:   Wt Readings from Last 3 Encounters:   02/17/20 195 lb (88.5 kg)   01/31/20 195 lb (88.5 kg)   11/08/19 190 lb 2 oz (86.2 kg)     There is no height or weight on file to calculate BMI.    CBC:   Lab Results   Component Value Date    WBC 8.1 02/18/2020    RBC 5.16 02/18/2020    HGB 13.9 02/18/2020    HCT 46.7 02/18/2020    MCV 90.5 02/18/2020    RDW 14.8 02/18/2020     02/18/2020       CMP:   Lab Results   Component Value Date     02/18/2020    K 3.8 02/18/2020    K 4.0 11/07/2019     02/18/2020    CO2 23 02/18/2020    BUN 15 02/18/2020    CREATININE 0.7 02/18/2020    GFRAA >60 02/18/2020    LABGLOM >60 02/18/2020    LABGLOM >60 12/27/2019    GLUCOSE 93 02/18/2020    GLUCOSE NEGATIVE 12/27/2019    PROT 6.7 12/27/2019    CALCIUM 9.1 02/18/2020    BILITOT NEGATIVE 12/27/2019    ALKPHOS 78 12/27/2019    AST 40 12/27/2019    ALT 40 12/27/2019       POC Tests: No results for input(s): POCGLU, POCNA, POCK, POCCL, POCBUN, POCHEMO, POCHCT in the last 72 hours.     Coags:   Lab Results   Component Value Date    PROTIME 10.9 12/27/2019    INR 1.0 12/27/2019    APTT 28.7 12/27/2019       HCG (If Applicable): No results found for: PREGTESTUR, PREGSERUM, HCG, HCGQUANT     ABGs: No results found for: PHART, PO2ART, HYG5AUU, QBZ0EPW, BEART, X9YNVAOU     Type & Screen (If Applicable):  No results found for: LABABO, LABRH    Drug/Infectious Status (If Applicable):  No results found for: HIV, HEPCAB    COVID-19 Screening (If Applicable): No results found for: COVID19      Anesthesia Evaluation  Patient summary reviewed and Nursing notes reviewed no history

## 2020-06-26 ENCOUNTER — NURSE ONLY (OUTPATIENT)
Dept: NON INVASIVE DIAGNOSTICS | Age: 70
End: 2020-06-26

## 2020-07-27 NOTE — PROGRESS NOTES
Cardiac Electrophysiology Outpatient Progress Note    Cheng Krishna  1950  Date of Service: 7/29/2020  Referring Provider/PCP: Carito Terrazas DO  Primary Electrophysiologist: Robel Ellis MD    Chief Complaint: Persistent atrial fibrillation on Sotalol therapy    SUBJECTIVE: Cheng Krishna presents to the office today for a follow -up. Since her last office visit Ms. Pepper Vivar underwent dual chamber pacemaker implantation on 2/17/20. She was noted to be in atrial flutter and underwent DC-cardioversion on 6/19/2. The patient currently feels great and offers no complaints from a device POV. The device site looks well healed and free from infection or erosion. She presents today in NSR. Device interrogation today showed 0% AF burden. The patient denies any chest pain, dyspnea, palpitations, dizziness, syncope, orthopnea or paroxysmal nocturnal dyspnea. The patient continues to be followed remotely.     Patient Active Problem List    Diagnosis Date Noted    Pacemaker 02/18/2020     Overview Note:     Qminder       TIA (transient ischemic attack) 11/06/2019    Transient visual loss of both eyes     Obesity (BMI 30.0-34.9) 09/27/2016    VHD (valvular heart disease) 09/27/2016     Overview Note:     Moderate MR       Sinus node dysfunction (HCC) 02/17/2016    CAD (coronary artery disease)     Atrial fibrillation (HCC)        Current Outpatient Medications   Medication Sig Dispense Refill    folic acid (FOLVITE) 1 MG tablet take 1 tablet by mouth once daily      hydroxyurea (HYDREA) 500 MG chemo capsule Take by mouth daily       metoprolol succinate (TOPROL XL) 50 MG extended release tablet Take 1 tablet by mouth 2 times daily 60 tablet 3    apixaban (ELIQUIS) 5 MG TABS tablet Take 1 tablet by mouth 2 times daily 180 tablet 3    vitamin D (ERGOCALCIFEROL) 52345 units CAPS capsule Take 50,000 Units by mouth once a week   0    sotalol (BETAPACE) 120 MG tablet Take 1 tablet by mouth every 12 hours 180 tablet 3    fenofibrate (TRICOR) 48 MG tablet take 1 tablet by mouth once daily  0    ranitidine (ZANTAC) 150 MG capsule Take 150 mg by mouth as needed for Heartburn      aspirin EC 81 MG EC tablet Take 81 mg by mouth daily       No current facility-administered medications for this visit. Allergies   Allergen Reactions    Ace Inhibitors Other (See Comments)     Cough    Bactrim [Sulfamethoxazole-Trimethoprim]     Erythromycin     Levaquin [Levofloxacin]     Lisinopril     Losartan     Pcn [Penicillins]     Sulfa Antibiotics     Trimethoprim      ROS:   Constitutional: Negative for fever, activity change and appetite change. HENT: Negative for epistaxis. Eyes: Negative for diploplia, blurred vision. Respiratory: Negative for cough, chest tightness, shortness of breath and wheezing. Cardiovascular: pertinent positives in HPI  Gastrointestinal: Negative for abdominal pain and blood in stool. All other review of systems are negative     PHYSICAL EXAM:  Vitals:    07/29/20 1014   BP: 124/86   Pulse: 75   Resp: 20   Weight: 199 lb (90.3 kg)   Height: 5' 5\" (1.651 m)   Constitutional: Oriented to person, place, and time. Well-developed and cooperative. Obese female   Head: Normocephalic and atraumatic. Cardiovascular:  Normal S1/ S2, Feet appear to be well perfused. Bradycardic. PMI is not displaced. Pulmonary/Chest: Effort normal and breath sounds normal. No respiratory distress. Musculoskeletal: Normal range of motion of all extremities, no muscle weakness. Neurological: Alert and oriented to person, place, and time. Gait normal.   Skin: Skin is warm and dry. No bruising, no ecchymosis and no rash noted. Extremity: No clubbing or cyanosis. No edema. Psychiatric: Normal mood and affect. Thought content normal.   Pacemaker site: well healed. No erosion, infection or migration.     EKG 7/29/20: NSR, Rate 75, GA 84, QRS 90, QTc 444- see scanned cardiology    Echocardiogram 2/12/20:  Findings      Left Ventricle   Normal left ventricular chamber size. Normal left ventricular systolic function. Visually estimated LVEF is 55-60 %. No wall motion abnormalities. Normal diastolic function. Right Ventricle   Normal right ventricle structure and function. Left Atrium   Left atrium is borderline enlarged. Interatrial septum not well visualized but appears intact. Right Atrium   Normal right atrium. Mitral Valve   Normal mitral valve structure. There is mild tricuspid regurgitation, ERO 0.1cm2, PISA 0.5cm, RV 19cc. No mitral valve prolapse. Tricuspid Valve   Normal tricuspid valve structure. There is mild tricuspid regurgitation, RVSP 35mmHg . Aortic Valve   Normal aortic valve structure and function. Pulmonic Valve   The pulmonic valve was not well visualized. Pericardial Effusion   No evidence of pericardial effusion. Pericardium appears normal.      Pleural Effusion   No evidence of pleural effusion. Aorta   Normal aortic root and ascending aorta. Miscellaneous   The inferior vena cava diameter is normal with normal respiratory   variation. Conclusions      Summary   Normal left ventricular chamber size. Normal left ventricular systolic function, LVEF is 55-60 %. Normal diastolic function. Left atrium is borderline enlarged. Interatrial septum not well visualized but appears intact. Normal right ventricle sized and function. There is mild tricuspid regurgitation, ERO 0.1cm2, PISA 0.5cm, RV 19cc. No mitral valve prolapse. There is mild tricuspid regurgitation, RVSP 35mmHg . Normal aortic root size. No evidence of pericardial effusion. Pericardium appears normal.   No intracardiac mass. Compared to prior study from 3/2016 - EF is better, MR and TR are less.       Signature      ----------------------------------------------------------------   Electronically signed by Florin De Oliveira MD(Interpreting physician) on 02/13/2020 08:16 AM   ----------------------------------------------------------------     M-Mode/2D Measurements & Calculations      LV Diastolic   LV Systolic Dimension: 3.4   AV Cusp Separation: 2 cmLA   Dimension: 5   cm                           Dimension: 4.1 cmAO Root   cm             LV Volume Diastolic: 526.5   Dimension: 3.5 cm   LV FS:32 %     ml   LV PW          LV Volume Systolic: 50.4 ml   Diastolic: 0.5 LV EDV/LV EDV Index: 119.9   cm             ml/61 ml/m^2LV ESV/LV ESV   LV PW          Index: 48.1 ml/25ml/ m^2     LA/Aorta: 5.29   Systolic: 1 cm EF Calculated: 59.9 %        Ascending Aorta: 2.5 cm   Septum         LV Mass Index: 39 l/min*m^2  LA volume/Index: 63 ml   Diastolic: 0.5                              /32ml/m^2   cm                                          RA Area: 20 cm^2   Septum         LVOT: 2 cm   Systolic: 1 cm                              IVC Expiration: 1.3 cm      LV Mass: 76.31   g     Doppler Measurements & Calculations      MV Peak E-Wave: 0.95   AV Peak Velocity: 1.54 m/s  LVOT Peak Velocity:   m/s                    AV Peak Gradient: 9.45 mmHg 0.81 m/s   MV Peak A-Wave: 0.67   AV Mean Velocity: 0.91 m/s  LVOT Mean Velocity:   m/s                    AV Mean Gradient: 4 mmHg    0.53 m/s   MV E/A Ratio: 1.41     AV VTI: 34.2 cm             LVOT Peak Gradient: 2.6   MV Peak Gradient: 5.8  AV Area (Continuity):1.89   mmHgLVOT Mean Gradient:   mmHg                   cm^2                        1.3 mmHg   MV Mean Gradient: 2.4                              Estimated RVSP: 34.8   mmHg                   LVOT VTI: 20.6 cm           mmHg   MV Mean Velocity: 0.72 IVRT: 90 msec               Estimated RAP:3 mmHg   m/s                    Estimated PASP: 34.83 mmHg   MV Deceleration Time:  Pulm. Vein A Reversal   207.8 msec             Duration:96.9 msec          TR Velocity:2.82 m/s   MV P1/2t: 73.4 msec    Pulm.  Vein D Velocity:0.84  TR Gradient:31.83 mmHg   MVA by PHT:3 cm^2 m/sPulm. Vein A Reversal    PV Peak Velocity: 0.78   MV Area (continuity):  Velocity:0.19 m/s           m/s   2.8 cm^2               Pulm. Vein S Velocity: 0.54 PV Peak Gradient: 2.45   MV E' Septal Velocity: m/s                         mmHg   0.1 m/s                                            PV Mean Velocity: 0.48   MV E' Lateral                                      m/s   Velocity: 11 m/s                                   PV Mean Gradient: 1.1   MR Velocity: 5.95 m/s                              mmHg   MV DORETHA PISA: 0.08 cm^2   MR VTI: 240.8 cm                                   AR ED Velocity: 1.18   Alias Velocity: 0.31                               m/s   m/sPISA Radius: 0.5 cm      PISA area: 1.57 cm^2MR   flow rate: 48.67   ml/sMR volume:19.26 ml    Cardiac event monitor 1/3/20:      Device Interrogation: 7/29/20   Underlying rhythm: AsVs  Mode: MVPR   Battery Voltage/Longevity:  13.1 years    Pacing: A: 90.6%  RV: <1%   P wave: 3.4 mV  Impedance: 513 ohms   Threshold: 1.0 V @ 0.4 ms  RV R wave: 14.3 mV  Impedance: 779 ohms   Threshold: 0.75 V @ 0.4 ms  Episodes: 1 NSVT episode, lasting 6 beats  Reprogramming included: see below  Overall device function is normal    All device programmable settings were evaluated per above and in the scanned document, along with iterative adjustments (capture thresholds) to assess and select the most appropriate final programming to provide for consistent delivery of the appropriate therapy and to verify function of the device. Assessment:     1. Pacemaker in situ  - Medtronic, dual chamber  - DOI: 2/17/20.  - Indication: Sinus node dysfunction  - Normal device function. 2. Persistent atrial fibrillation and flutter  - Discovered in 2000. - Symptomatic.  - [id number] (Vasc disease, CAD, HF, female)  - Current anticoagulation includes Eliquis.    - Previously on Multaq; stopped due to side effects.   - She has a contraindication to class IC antiarrhythmic care with the other providers. Thank you very much to allowing me to participate in the patient's care.     Alicia Anand MD   Cardiac Electrophysiology  [facility]  [facility] [place]: [facility]  7/29/20   10:23 AM

## 2020-07-29 ENCOUNTER — OFFICE VISIT (OUTPATIENT)
Dept: NON INVASIVE DIAGNOSTICS | Age: 70
End: 2020-07-29
Payer: COMMERCIAL

## 2020-07-29 VITALS
DIASTOLIC BLOOD PRESSURE: 86 MMHG | HEART RATE: 75 BPM | BODY MASS INDEX: 33.15 KG/M2 | HEIGHT: 65 IN | SYSTOLIC BLOOD PRESSURE: 124 MMHG | WEIGHT: 199 LBS | RESPIRATION RATE: 20 BRPM

## 2020-07-29 PROCEDURE — 1123F ACP DISCUSS/DSCN MKR DOCD: CPT | Performed by: INTERNAL MEDICINE

## 2020-07-29 PROCEDURE — G8427 DOCREV CUR MEDS BY ELIG CLIN: HCPCS | Performed by: INTERNAL MEDICINE

## 2020-07-29 PROCEDURE — 99214 OFFICE O/P EST MOD 30 MIN: CPT | Performed by: INTERNAL MEDICINE

## 2020-07-29 PROCEDURE — 1036F TOBACCO NON-USER: CPT | Performed by: INTERNAL MEDICINE

## 2020-07-29 PROCEDURE — G8417 CALC BMI ABV UP PARAM F/U: HCPCS | Performed by: INTERNAL MEDICINE

## 2020-07-29 PROCEDURE — 1090F PRES/ABSN URINE INCON ASSESS: CPT | Performed by: INTERNAL MEDICINE

## 2020-07-29 PROCEDURE — 4040F PNEUMOC VAC/ADMIN/RCVD: CPT | Performed by: INTERNAL MEDICINE

## 2020-07-29 PROCEDURE — G8400 PT W/DXA NO RESULTS DOC: HCPCS | Performed by: INTERNAL MEDICINE

## 2020-07-29 PROCEDURE — 3017F COLORECTAL CA SCREEN DOC REV: CPT | Performed by: INTERNAL MEDICINE

## 2020-07-29 PROCEDURE — 93280 PM DEVICE PROGR EVAL DUAL: CPT | Performed by: INTERNAL MEDICINE

## 2020-07-29 RX ORDER — HYDROXYUREA 500 MG/1
CAPSULE ORAL DAILY
COMMUNITY
Start: 2020-06-30

## 2020-07-29 RX ORDER — FOLIC ACID 1 MG/1
TABLET ORAL NIGHTLY
COMMUNITY
Start: 2020-07-21

## 2020-08-21 ENCOUNTER — NURSE ONLY (OUTPATIENT)
Dept: NON INVASIVE DIAGNOSTICS | Age: 70
End: 2020-08-21
Payer: COMMERCIAL

## 2020-08-21 PROCEDURE — 93296 REM INTERROG EVL PM/IDS: CPT | Performed by: INTERNAL MEDICINE

## 2020-08-21 PROCEDURE — 93294 REM INTERROG EVL PM/LDLS PM: CPT | Performed by: INTERNAL MEDICINE

## 2020-08-27 NOTE — PROGRESS NOTES
See [facility] [place] report. Remote monitoring reviewed over a 90 day period. End of 90 day monitoring period date of service 8.21.2020.

## 2020-10-12 RX ORDER — METOPROLOL SUCCINATE 50 MG/1
TABLET, EXTENDED RELEASE ORAL
Qty: 180 TABLET | Refills: 3 | Status: SHIPPED | OUTPATIENT
Start: 2020-10-12

## 2020-11-20 ENCOUNTER — NURSE ONLY (OUTPATIENT)
Dept: NON INVASIVE DIAGNOSTICS | Age: 70
End: 2020-11-20
Payer: COMMERCIAL

## 2020-11-20 PROCEDURE — 93296 REM INTERROG EVL PM/IDS: CPT | Performed by: INTERNAL MEDICINE

## 2020-11-20 PROCEDURE — 93294 REM INTERROG EVL PM/LDLS PM: CPT | Performed by: INTERNAL MEDICINE

## 2020-12-07 NOTE — PROGRESS NOTES
See [facility] [place] report. Remote monitoring reviewed over a 90 day period. End of 90 day monitoring period date of service 11.20.2020.

## 2021-02-03 NOTE — PROGRESS NOTES
Cardiac Electrophysiology Outpatient Progress Note    Louise Arnold  1950  Date of Service: 2/9/2021  Referring Provider/PCP: DONALD Rogers NP  Primary Electrophysiologist: Peter Luciano MD    Chief Complaint: Persistent atrial fibrillation on Sotalol therapy    SUBJECTIVE: Louise Arnold presents to the office today for a follow -up. She was last seen by Dr. Paco Gamez on 07/29/20 and was doing well at that time. She presents today in sinus with a QTc of 438ms and an AF burden of <1%  her CrCl is 60mL/min  based on lab work from 01/11/21. She is compliant with her remotes and her device site is free of erosion and migration. She has no complains of chest pain, palpitation, feelings of heart racing, syncope, near syncope, BLE edema or dyspnea on exertion she notes a significant amount on inactivity and was instructed to begin a physical activity regiment.         Patient Active Problem List    Diagnosis Date Noted    Pacemaker 02/18/2020     Overview Note:     Medtronic       TIA (transient ischemic attack) 11/06/2019    Transient visual loss of both eyes     Obesity (BMI 30.0-34.9) 09/27/2016    VHD (valvular heart disease) 09/27/2016     Overview Note:     Moderate MR       Sinus node dysfunction (HCC) 02/17/2016    CAD (coronary artery disease)     Atrial fibrillation (HCC)        Current Outpatient Medications   Medication Sig Dispense Refill    sotalol (BETAPACE) 120 MG tablet Take 1 tablet by mouth every 12 hours 180 tablet 1    amLODIPine (NORVASC) 2.5 MG tablet Take 2.5 mg by mouth daily      metoprolol succinate (TOPROL XL) 50 MG extended release tablet take 1 tablet by mouth twice a day 070 tablet 3    folic acid (FOLVITE) 1 MG tablet take 1 tablet by mouth once daily      hydroxyurea (HYDREA) 500 MG chemo capsule Take by mouth daily       apixaban (ELIQUIS) 5 MG TABS tablet Take 1 tablet by mouth 2 times daily 180 tablet 3    vitamin D (ERGOCALCIFEROL) 29700 units CAPS scanned cardiology    Echocardiogram 2/12/20:  Findings      Left Ventricle   Normal left ventricular chamber size. Normal left ventricular systolic function. Visually estimated LVEF is 55-60 %. No wall motion abnormalities. Normal diastolic function. Right Ventricle   Normal right ventricle structure and function. Left Atrium   Left atrium is borderline enlarged. Interatrial septum not well visualized but appears intact. Right Atrium   Normal right atrium. Mitral Valve   Normal mitral valve structure. There is mild tricuspid regurgitation, ERO 0.1cm2, PISA 0.5cm, RV 19cc. No mitral valve prolapse. Tricuspid Valve   Normal tricuspid valve structure. There is mild tricuspid regurgitation, RVSP 35mmHg . Aortic Valve   Normal aortic valve structure and function. Pulmonic Valve   The pulmonic valve was not well visualized. Pericardial Effusion   No evidence of pericardial effusion. Pericardium appears normal.      Pleural Effusion   No evidence of pleural effusion. Aorta   Normal aortic root and ascending aorta. Miscellaneous   The inferior vena cava diameter is normal with normal respiratory   variation. Conclusions      Summary   Normal left ventricular chamber size. Normal left ventricular systolic function, LVEF is 55-60 %. Normal diastolic function. Left atrium is borderline enlarged. Interatrial septum not well visualized but appears intact. Normal right ventricle sized and function. There is mild tricuspid regurgitation, ERO 0.1cm2, PISA 0.5cm, RV 19cc. No mitral valve prolapse. There is mild tricuspid regurgitation, RVSP 35mmHg . Normal aortic root size. No evidence of pericardial effusion. Pericardium appears normal.   No intracardiac mass. Compared to prior study from 3/2016 - EF is better, MR and TR are less.       Signature      ----------------------------------------------------------------   Electronically signed by Francesca Collier MD(Interpreting   physician) on 02/13/2020 08:16 AM   ----------------------------------------------------------------     M-Mode/2D Measurements & Calculations      LV Diastolic   LV Systolic Dimension: 3.4   AV Cusp Separation: 2 cmLA   Dimension: 5   cm                           Dimension: 4.1 cmAO Root   cm             LV Volume Diastolic: 262.0   Dimension: 3.5 cm   LV FS:32 %     ml   LV PW          LV Volume Systolic: 82.3 ml   Diastolic: 0.5 LV EDV/LV EDV Index: 119.9   cm             ml/61 ml/m^2LV ESV/LV ESV   LV PW          Index: 48.1 ml/25ml/ m^2     LA/Aorta: 7.24   Systolic: 1 cm EF Calculated: 59.9 %        Ascending Aorta: 2.5 cm   Septum         LV Mass Index: 39 l/min*m^2  LA volume/Index: 63 ml   Diastolic: 0.5                              /32ml/m^2   cm                                          RA Area: 20 cm^2   Septum         LVOT: 2 cm   Systolic: 1 cm                              IVC Expiration: 1.3 cm      LV Mass: 76.31   g     Doppler Measurements & Calculations      MV Peak E-Wave: 0.95   AV Peak Velocity: 1.54 m/s  LVOT Peak Velocity:   m/s                    AV Peak Gradient: 9.45 mmHg 0.81 m/s   MV Peak A-Wave: 0.67   AV Mean Velocity: 0.91 m/s  LVOT Mean Velocity:   m/s                    AV Mean Gradient: 4 mmHg    0.53 m/s   MV E/A Ratio: 1.41     AV VTI: 34.2 cm             LVOT Peak Gradient: 2.6   MV Peak Gradient: 5.8  AV Area (Continuity):1.89   mmHgLVOT Mean Gradient:   mmHg                   cm^2                        1.3 mmHg   MV Mean Gradient: 2.4                              Estimated RVSP: 34.8   mmHg                   LVOT VTI: 20.6 cm           mmHg   MV Mean Velocity: 0.72 IVRT: 90 msec               Estimated RAP:3 mmHg   m/s                    Estimated PASP: 34.83 mmHg   MV Deceleration Time:  Pulm. Vein A Reversal   207.8 msec             Duration:96.9 msec          TR Velocity:2.82 m/s   MV P1/2t: 73.4 msec    Pulm.  Vein D Velocity:0.84  TR Gradient:31.83 mmHg   MVA by PHT:3 cm^2      m/sPulm. Vein A Reversal    PV Peak Velocity: 0.78   MV Area (continuity):  Velocity:0.19 m/s           m/s   2.8 cm^2               Pulm. Vein S Velocity: 0.54 PV Peak Gradient: 2.45   MV E' Septal Velocity: m/s                         mmHg   0.1 m/s                                            PV Mean Velocity: 0.48   MV E' Lateral                                      m/s   Velocity: 11 m/s                                   PV Mean Gradient: 1.1   MR Velocity: 5.95 m/s                              mmHg   MV DORETHA PISA: 0.08 cm^2   MR VTI: 240.8 cm                                   ID ED Velocity: 1.18   Alias Velocity: 0.31                               m/s   m/sPISA Radius: 0.5 cm      PISA area: 1.57 cm^2MR   flow rate: 48.67   ml/sMR volume:19.26 ml    Cardiac event monitor 1/3/20:        Device Interrogation ( 02/09/21 )  Make/Model Medtronic Hem[place]atha  Mode AAIR--> DDDR 70/130  P wave: 2.1 mV  Impedance: 570 ohms   Threshold: 1.0 V @ 0.4 ms  RV R wave: 12.6 mV  Impedance: 836 ohms   Threshold: 1.0 V @ 0.4 ms  Pacing: A: 92%  RV: <1%   Battery Voltage/Longevity:  12.3    Arrhythmias: 1 AF on 08/24/20 1min 29 sec. ATP successful. 2 VTNS 1 to sec appears to be 1:1 -169 bpm  Reprogramming included none   Overall device function is normal  All device programmable settings were evaluated per above and in the scanned document, along with iterative adjustments (capture thresholds) to assess and select the most appropriate final programming to provide for consistent delivery of the appropriate therapy and to verify function of the device. All device programmable settings were evaluated per above and in the scanned document, along with iterative adjustments (capture thresholds) to assess and select the most appropriate final programming to provide for consistent delivery of the appropriate therapy and to verify function of the device. Assessment:     1.  Pacemaker in situ  - Medtronic, dual chamber  - DOI: 2/17/20.  - Indication: Sinus node dysfunction  - Normal device function.  - Atrial dependent. 2. Persistent atrial fibrillation and flutter  - Discovered in 2000. - Symptomatic.  - [id number] (Vasc disease, CAD, HF, female)  - Current anticoagulation includes Eliquis. - Previously on Multaq; stopped due to side effects.   - She has a contraindication to class IC antiarrhythmic drugs given the CAD. - Receiving Sotalol 120 mg every 12 hours; initiated 2/2016.  - On Toprol XL for rate control.  - Reccurent PAF in December 2018 and April 2019.  - S/p DC-cardioversion April 2019.  - S/p pacemaker implantation 2/17/20.  - S/p DC-cardioversion 6/19/20.  - AF burden of <1   - Presents in NSR with stable QTc of 438ms   - CrCl is 60 by IBW   - Re-education on importance of well controlled HTN (goal BP < 130/80), adequate weight control (goal BMI of < 27), physical activity consisting of moderate cardiopulmonary exercise up to a goal of 250 min/wk, smoking cessation and limited ETOH intake.     3. Sinus node dysfunction  -  Symptomatic.  - On Sotalol for PAF.  - Toprol discontinued 11/8/19 due to bradycardia. - status post pacemaker implantation 2/17/20.     4. Coronary artery disease  - S/p inferior wall MI and RCA stent in July 2015.  - On ASA, Toprol XL and Tricor.  - Denies any recent CP.      - Follows with Dr. Lani Woodard. 5. Chronic systolic heart failure  - Recovered LV EF.  - NYHA class I-II, ACC stage C  - Echo on 2/3/16, calculated LV EF 40.6% and visually estimated LV EF 35-40%. - Echo 7/19/19, LV EF 55%. - Echo 2/12/20, LV EF 55-60%. - On Toprol XL.  - Following with Dr Lani Woodard.      6. Obesity  - Body mass index is 33.95 kg/m². - Recommend weight loss.     7. Valvular heart disease  - Mild MR and TR on TTE 2/12/20.  - Follows with Dr. Lani Woodard. 8. Sleep disturbance  - Advised sleep study. - Currently defers. Plan:      1.  Continue Sotalol 120 mg every 12 hours. 2. Continue Toprol XL 50 mg bid. 3. Continue Eliquis 5 mg bid. 4. EKG, BMP and magnesium every  6 months. 5. Remote monitoring every 91 days   6. Follow up in 6 months with Dr. Rian Villarreal . Encouraged the patient to call the office for any questions or concerns. I have spent a total of 30 minutes with the patient and the family reviewing the above stated recommendations. And a total of >50% of that time involved face-to-face time providing counseling and or coordination of care with the other providers. Thank you very much to allowing me to participate in the patient's care.     Electronically signed by DONALD Mulligan CNP on 2/9/2021 at 3:39 PM    Cardiac Electrophysiology  [address]   [facility] [place]: [facility]  2/9/21   3:39 PM

## 2021-02-09 ENCOUNTER — OFFICE VISIT (OUTPATIENT)
Dept: NON INVASIVE DIAGNOSTICS | Age: 71
End: 2021-02-09
Payer: COMMERCIAL

## 2021-02-09 VITALS
RESPIRATION RATE: 18 BRPM | HEART RATE: 73 BPM | WEIGHT: 204 LBS | HEIGHT: 65 IN | DIASTOLIC BLOOD PRESSURE: 78 MMHG | SYSTOLIC BLOOD PRESSURE: 124 MMHG | BODY MASS INDEX: 33.99 KG/M2

## 2021-02-09 DIAGNOSIS — Z95.0 PACEMAKER: Primary | ICD-10-CM

## 2021-02-09 DIAGNOSIS — I48.0 PAROXYSMAL ATRIAL FIBRILLATION (HCC): ICD-10-CM

## 2021-02-09 PROCEDURE — 3017F COLORECTAL CA SCREEN DOC REV: CPT | Performed by: NURSE PRACTITIONER

## 2021-02-09 PROCEDURE — 1036F TOBACCO NON-USER: CPT | Performed by: NURSE PRACTITIONER

## 2021-02-09 PROCEDURE — G8484 FLU IMMUNIZE NO ADMIN: HCPCS | Performed by: NURSE PRACTITIONER

## 2021-02-09 PROCEDURE — 1090F PRES/ABSN URINE INCON ASSESS: CPT | Performed by: NURSE PRACTITIONER

## 2021-02-09 PROCEDURE — G8427 DOCREV CUR MEDS BY ELIG CLIN: HCPCS | Performed by: NURSE PRACTITIONER

## 2021-02-09 PROCEDURE — G8417 CALC BMI ABV UP PARAM F/U: HCPCS | Performed by: NURSE PRACTITIONER

## 2021-02-09 PROCEDURE — G8400 PT W/DXA NO RESULTS DOC: HCPCS | Performed by: NURSE PRACTITIONER

## 2021-02-09 PROCEDURE — 4040F PNEUMOC VAC/ADMIN/RCVD: CPT | Performed by: NURSE PRACTITIONER

## 2021-02-09 PROCEDURE — 93280 PM DEVICE PROGR EVAL DUAL: CPT | Performed by: NURSE PRACTITIONER

## 2021-02-09 PROCEDURE — 99213 OFFICE O/P EST LOW 20 MIN: CPT | Performed by: NURSE PRACTITIONER

## 2021-02-09 PROCEDURE — 93000 ELECTROCARDIOGRAM COMPLETE: CPT | Performed by: INTERNAL MEDICINE

## 2021-02-09 PROCEDURE — 1123F ACP DISCUSS/DSCN MKR DOCD: CPT | Performed by: NURSE PRACTITIONER

## 2021-02-09 RX ORDER — SOTALOL HYDROCHLORIDE 120 MG/1
120 TABLET ORAL EVERY 12 HOURS
Qty: 180 TABLET | Refills: 1 | Status: SHIPPED | OUTPATIENT
Start: 2021-02-09

## 2021-02-09 RX ORDER — AMLODIPINE BESYLATE 2.5 MG/1
2.5 TABLET ORAL NIGHTLY
COMMUNITY

## 2021-02-19 ENCOUNTER — NURSE ONLY (OUTPATIENT)
Dept: NON INVASIVE DIAGNOSTICS | Age: 71
End: 2021-02-19
Payer: COMMERCIAL

## 2021-02-19 DIAGNOSIS — I49.5 SINUS NODE DYSFUNCTION (HCC): ICD-10-CM

## 2021-02-19 DIAGNOSIS — I48.0 PAROXYSMAL ATRIAL FIBRILLATION (HCC): ICD-10-CM

## 2021-02-19 DIAGNOSIS — Z95.0 PACEMAKER: Primary | ICD-10-CM

## 2021-02-19 PROCEDURE — 93296 REM INTERROG EVL PM/IDS: CPT | Performed by: INTERNAL MEDICINE

## 2021-02-19 PROCEDURE — 93294 REM INTERROG EVL PM/LDLS PM: CPT | Performed by: INTERNAL MEDICINE

## 2021-03-10 NOTE — PROGRESS NOTES
See [facility] [place] report. Remote monitoring reviewed over a 90 day period. End of 90 day monitoring period date of service  2.19.2021.

## 2021-05-21 ENCOUNTER — NURSE ONLY (OUTPATIENT)
Dept: NON INVASIVE DIAGNOSTICS | Age: 71
End: 2021-05-21
Payer: MEDICARE

## 2021-05-21 DIAGNOSIS — I49.5 SINUS NODE DYSFUNCTION (HCC): ICD-10-CM

## 2021-05-21 DIAGNOSIS — Z95.0 PACEMAKER: Primary | ICD-10-CM

## 2021-05-21 DIAGNOSIS — I48.0 PAROXYSMAL ATRIAL FIBRILLATION (HCC): ICD-10-CM

## 2021-07-06 NOTE — PROGRESS NOTES
Remote transmission results:    Remote monitoring reviewed over a 90 day period.   End of 90 day monitoring period date of service 5/20/2021    Make/Model MDT [place]   Mode MVPR 70/130 ppm  P wave: 2.4 mV  Impedance: 456 ohms   Threshold: 0.750 V @ 0.4 ms  RV R wave: 12.3 mV  Impedance: 722 ohms   Threshold: 0.750 V @ 0.4 ms  Pacing: A: 94%  RV: <0.1%   Battery Voltage/Longevity: 12.1 years  Arrhythmias: AF burden <0.1%    Medications  Sotalol 120 mg BID  Norvasc 2.5 mg QD  Toprol XL 50 mg BID  Eliquis 5mg BID  ASA 81 mg QD    Comment  SND  Persistent AF/Fl  CAD/RCA stent  HFpEF  Deferred sleep study        Jennifer Watkins, APRN-CNP, [facility]

## 2021-07-08 PROCEDURE — 93296 REM INTERROG EVL PM/IDS: CPT | Performed by: INTERNAL MEDICINE

## 2021-07-08 PROCEDURE — 93294 REM INTERROG EVL PM/LDLS PM: CPT | Performed by: INTERNAL MEDICINE

## 2021-09-27 NOTE — PROGRESS NOTES
Cardiac Electrophysiology Outpatient Progress Note    Alta Spatz  1950  Date of Service: 9/28/2021  Referring Provider/PCP: DONALD Mathias - NP  Primary Electrophysiologist: Jamia Nieto MD    Chief Complaint: Persistent atrial fibrillation on Sotalol therapy    SUBJECTIVE: Alta Spatz presents to the office today for a follow -up. The patient currently feels well, denies a reoccurrence of AF and is maintaining SR on Sotalol  therapy. She  offers no complaints from a device POV. The device site looks well healed and free from infection or erosion. She reports having a hematuria 6 weeks ago and was seen by her Primary. She followed up with Dr. Arnulfo Ratliff for thrombocythemia. The patient denies any chest pain, dyspnea, palpitations, dizziness, syncope, orthopnea or paroxysmal nocturnal dyspnea. The patient continues to be followed remotely.     Patient Active Problem List    Diagnosis Date Noted    Transient visual loss 09/28/2021    Pacemaker 02/18/2020     Overview Note:     Hybrent       TIA (transient ischemic attack) 11/06/2019    Transient visual loss of both eyes     Obesity (BMI 30.0-34.9) 09/27/2016    VHD (valvular heart disease) 09/27/2016     Overview Note:     Moderate MR       Sinus node dysfunction (HCC) 02/17/2016    CAD (coronary artery disease)     Atrial fibrillation (HCC)        Current Outpatient Medications   Medication Sig Dispense Refill    alendronate (FOSAMAX) 35 MG tablet Take 35 mg by mouth once a week      sotalol (BETAPACE) 120 MG tablet Take 1 tablet by mouth every 12 hours 180 tablet 1    amLODIPine (NORVASC) 2.5 MG tablet Take 2.5 mg by mouth daily      metoprolol succinate (TOPROL XL) 50 MG extended release tablet take 1 tablet by mouth twice a day 972 tablet 3    folic acid (FOLVITE) 1 MG tablet take 1 tablet by mouth once daily      hydroxyurea (HYDREA) 500 MG chemo capsule Take by mouth daily       apixaban (ELIQUIS) 5 MG TABS tablet Take 1 tablet by mouth 2 times daily 180 tablet 3    vitamin D (ERGOCALCIFEROL) 59832 units CAPS capsule Take 50,000 Units by mouth once a week   0    fenofibrate (TRICOR) 48 MG tablet take 1 tablet by mouth once daily  0    aspirin EC 81 MG EC tablet Take 81 mg by mouth daily      ranitidine (ZANTAC) 150 MG capsule Take 150 mg by mouth as needed for Heartburn (Patient not taking: Reported on 9/28/2021)       No current facility-administered medications for this visit. Allergies   Allergen Reactions    Ace Inhibitors Other (See Comments)     Cough    Bactrim [Sulfamethoxazole-Trimethoprim]     Erythromycin     Levaquin [Levofloxacin]     Lisinopril     Losartan     Pcn [Penicillins]     Sulfa Antibiotics     Trimethoprim      ROS:   Constitutional: Negative for fever, activity change and appetite change. HENT: Negative for epistaxis. Eyes: Negative for diploplia, blurred vision. Respiratory: Negative for cough, chest tightness, shortness of breath and wheezing. Cardiovascular: pertinent positives in HPI  Gastrointestinal: Negative for abdominal pain and blood in stool. All other review of systems are negative     PHYSICAL EXAM:  Vitals:    09/28/21 1307   BP: 122/86   Site: Left Upper Arm   Position: Sitting   Cuff Size: Medium Adult   Pulse: 75   Resp: 16   SpO2: 98%   Weight: 197 lb 9.6 oz (89.6 kg)   Height: 5' 5\" (1.651 m)      Constitutional: Oriented to person, place, and time. Well-developed and cooperative. Obese female   Head: Normocephalic and atraumatic. Cardiovascular:  Normal S1/ S2, Feet appear to be well perfused. PMI is not displaced. Pulmonary/Chest: Effort normal and breath sounds normal. No respiratory distress. Musculoskeletal: Normal range of motion of all extremities, no muscle weakness. Neurological: Alert and oriented to person, place, and time. Gait normal.   Skin: Skin is warm and dry. No bruising, no ecchymosis and no rash noted.    Extremity: No clubbing or Pericardium appears normal.   No intracardiac mass. Compared to prior study from 3/2016 - EF is better, MR and TR are less.       Signature      ----------------------------------------------------------------   Electronically signed by Jana Kaminski MD(Interpreting   physician) on 02/13/2020 08:16 AM   ----------------------------------------------------------------     M-Mode/2D Measurements & Calculations      LV Diastolic   LV Systolic Dimension: 3.4   AV Cusp Separation: 2 cmLA   Dimension: 5   cm                           Dimension: 4.1 cmAO Root   cm             LV Volume Diastolic: 331.6   Dimension: 3.5 cm   LV FS:32 %     ml   LV PW          LV Volume Systolic: 49.0 ml   Diastolic: 0.5 LV EDV/LV EDV Index: 119.9   cm             ml/61 ml/m^2LV ESV/LV ESV   LV PW          Index: 48.1 ml/25ml/ m^2     LA/Aorta: 1.01   Systolic: 1 cm EF Calculated: 59.9 %        Ascending Aorta: 2.5 cm   Septum         LV Mass Index: 39 l/min*m^2  LA volume/Index: 63 ml   Diastolic: 0.5                              /32ml/m^2   cm                                          RA Area: 20 cm^2   Septum         LVOT: 2 cm   Systolic: 1 cm                              IVC Expiration: 1.3 cm      LV Mass: 76.31   g     Doppler Measurements & Calculations      MV Peak E-Wave: 0.95   AV Peak Velocity: 1.54 m/s  LVOT Peak Velocity:   m/s                    AV Peak Gradient: 9.45 mmHg 0.81 m/s   MV Peak A-Wave: 0.67   AV Mean Velocity: 0.91 m/s  LVOT Mean Velocity:   m/s                    AV Mean Gradient: 4 mmHg    0.53 m/s   MV E/A Ratio: 1.41     AV VTI: 34.2 cm             LVOT Peak Gradient: 2.6   MV Peak Gradient: 5.8  AV Area (Continuity):1.89   mmHgLVOT Mean Gradient:   mmHg                   cm^2                        1.3 mmHg   MV Mean Gradient: 2.4                              Estimated RVSP: 34.8   mmHg                   LVOT VTI: 20.6 cm           mmHg   MV Mean Velocity: 0.72 IVRT: 90 msec               Estimated RAP:3 mmHg   m/s                    Estimated PASP: 34.83 mmHg   MV Deceleration Time:  Pulm. Vein A Reversal   207.8 msec             Duration:96.9 msec          TR Velocity:2.82 m/s   MV P1/2t: 73.4 msec    Pulm. Vein D Velocity:0.84  TR Gradient:31.83 mmHg   MVA by PHT:3 cm^2      m/sPulm. Vein A Reversal    PV Peak Velocity: 0.78   MV Area (continuity):  Velocity:0.19 m/s           m/s   2.8 cm^2               Pulm. Vein S Velocity: 0.54 PV Peak Gradient: 2.45   MV E' Septal Velocity: m/s                         mmHg   0.1 m/s                                            PV Mean Velocity: 0.48   MV E' Lateral                                      m/s   Velocity: 11 m/s                                   PV Mean Gradient: 1.1   MR Velocity: 5.95 m/s                              mmHg   MV DORETHA PISA: 0.08 cm^2   MR VTI: 240.8 cm                                   MN ED Velocity: 1.18   Alias Velocity: 0.31                               m/s   m/sPISA Radius: 0.5 cm      PISA area: 1.57 cm^2MR   flow rate: 48.67   ml/sMR volume:19.26 ml    Cardiac event monitor 1/3/20:        Device Interrogation 9/28/21:    Make/Model Medtronic Hemalatha  Mode AAIR--> DDDR 70/130  P wave: 2.6 mV  Impedance: 494 ohms   Threshold: 0.75 V @ 0.4 ms  RV R wave: 16 mV  Impedance: 760 ohms   Threshold: 0.75 V @ 0.4 ms  Pacing: A: 94.1%  RV:<0.1%   Battery Voltage/Longevity: 11.6 years   Arrhythmias: 3 VTNS episodes lasting 1-3 seconds  Reprogramming included see below  Overall device function is normal  All device programmable settings were evaluated per above and in the scanned document, along with iterative adjustments (capture thresholds) to assess and select the most appropriate final programming to provide for consistent delivery of the appropriate therapy and to verify function of the device. Assessment:     1.  Pacemaker in situ  - Medtronic, dual chamber  - DOI: 2/17/20.  - Indication: Sinus node dysfunction  - Normal device function.  - Atrial dependent. 2. Persistent atrial fibrillation and flutter  - Discovered in 2000. - Symptomatic.  - [id number] (Vasc disease, CAD, HF, female)  - Current anticoagulation includes Eliquis. - Previously on Multaq; stopped due to side effects.   - She has a contraindication to class IC antiarrhythmic drugs given the CAD. - Receiving Sotalol 120 mg every 12 hours; initiated 2/2016.  - On Toprol XL for rate control.  - Reccurent PAF in December 2018 and April 2019.  - S/p DC-cardioversion April 2019.  - S/p pacemaker implantation 2/17/20.  - S/p DC-cardioversion 6/19/20.  - AF burden of 0%   - Presents in NSR with stable QTc.  - CrCl 60.11 mL/min by IBW base on Cr of 0.75 on 1/11/21.  - Re-education on importance of well controlled HTN (goal BP < 130/80), adequate weight control (goal BMI of < 27), physical activity consisting of moderate cardiopulmonary exercise up to a goal of 250 min/wk, smoking cessation and limited ETOH intake.     3. Sinus node dysfunction  - Symptomatic.  - On Sotalol for PAF.  - Toprol XLdiscontinued 11/8/19 due to bradycardia. - status post pacemaker implantation 2/17/20.     4. Coronary artery disease  - S/p inferior wall MI and RCA stent in July 2015.  - On ASA, Toprol XL and Tricor.  - Denies any recent CP.      - Follows with Dr. Massiel Ragland. 5. Chronic systolic heart failure  - Recovered LV EF.  - NYHA class I-II, ACC stage C  - Echo on 2/3/16, calculated LV EF 40.6% and visually estimated LV EF 35-40%. - Echo 7/19/19, LV EF 55%. - Echo 2/12/20, LV EF 55-60%. - On Toprol XL.  - Following with Dr Massiel Ragland.      6. Obesity  - Body mass index is 32.88 kg/m². - Recommend weight loss.     7. Valvular heart disease  - Mild MR and TR on TTE 2/12/20.  - Follows with Dr. Massiel Ragland. 8. Sleep disturbance  - Advised sleep study. - Currently defers. 9. Essential thrombocythemia  - On Hydrea. - Follows with Dr. Luis Carey. 10. Hematuria  - Reported 6 weeks ago.   - Resolved  - The patient states that Dr. Willis Cardenas was asking that can ASA be stopped or Eliquis be reduced to lower dose. I advised her to contact Cardiology to inquire whether ASA can be discontinued. I advised her to say on Eliquis at current dose. Plan:      1. Normal pacemaker function. 2. Continue Sotalol 120 mg every 12 hours. 3. Continue Toprol XL 50 mg bid. 4. Continue Eliquis 5 mg bid. 5. Obtain EKG, BMP and magnesium every 3 to  6 months including today. 6. Remote monitoring every 91 days   7. Follow up in 6 months or sooner PRN. Encouraged the patient to call the office for any questions or concerns. I have spent a total of 40 minutes with the patient and the family reviewing the above stated recommendations. And a total of >50% of that time involved face-to-face time providing counseling and/or coordination of care with the other providers, preparation for the clinic visit, reviewing records/tests, counseling/education of the patient, ordering medications/tests/procedures, coordinating care, and documenting clinical information in the EHR. Thank you very much to allowing me to participate in the patient's care.     Arie Cedillo MD  Cardiac Electrophysiology  [facility]) Physicians  [facility] [place]: [facility]  1:32 PM  9/28/2021

## 2021-09-28 ENCOUNTER — OFFICE VISIT (OUTPATIENT)
Dept: NON INVASIVE DIAGNOSTICS | Age: 71
End: 2021-09-28
Payer: COMMERCIAL

## 2021-09-28 VITALS
OXYGEN SATURATION: 98 % | WEIGHT: 197.6 LBS | HEIGHT: 65 IN | SYSTOLIC BLOOD PRESSURE: 122 MMHG | DIASTOLIC BLOOD PRESSURE: 86 MMHG | BODY MASS INDEX: 32.92 KG/M2 | HEART RATE: 75 BPM | RESPIRATION RATE: 16 BRPM

## 2021-09-28 DIAGNOSIS — I49.5 SINUS NODE DYSFUNCTION (HCC): ICD-10-CM

## 2021-09-28 DIAGNOSIS — Z95.0 PACEMAKER: Primary | ICD-10-CM

## 2021-09-28 DIAGNOSIS — I48.91 ATRIAL FIBRILLATION, UNSPECIFIED TYPE (HCC): Primary | ICD-10-CM

## 2021-09-28 DIAGNOSIS — I48.0 PAROXYSMAL ATRIAL FIBRILLATION (HCC): ICD-10-CM

## 2021-09-28 PROBLEM — H53.129 TRANSIENT VISUAL LOSS: Status: ACTIVE | Noted: 2021-09-28

## 2021-09-28 PROCEDURE — 3017F COLORECTAL CA SCREEN DOC REV: CPT | Performed by: INTERNAL MEDICINE

## 2021-09-28 PROCEDURE — G8400 PT W/DXA NO RESULTS DOC: HCPCS | Performed by: INTERNAL MEDICINE

## 2021-09-28 PROCEDURE — 93000 ELECTROCARDIOGRAM COMPLETE: CPT | Performed by: INTERNAL MEDICINE

## 2021-09-28 PROCEDURE — 99215 OFFICE O/P EST HI 40 MIN: CPT | Performed by: INTERNAL MEDICINE

## 2021-09-28 PROCEDURE — 1123F ACP DISCUSS/DSCN MKR DOCD: CPT | Performed by: INTERNAL MEDICINE

## 2021-09-28 PROCEDURE — 36415 COLL VENOUS BLD VENIPUNCTURE: CPT | Performed by: INTERNAL MEDICINE

## 2021-09-28 PROCEDURE — 4040F PNEUMOC VAC/ADMIN/RCVD: CPT | Performed by: INTERNAL MEDICINE

## 2021-09-28 PROCEDURE — G8417 CALC BMI ABV UP PARAM F/U: HCPCS | Performed by: INTERNAL MEDICINE

## 2021-09-28 PROCEDURE — 1036F TOBACCO NON-USER: CPT | Performed by: INTERNAL MEDICINE

## 2021-09-28 PROCEDURE — G8427 DOCREV CUR MEDS BY ELIG CLIN: HCPCS | Performed by: INTERNAL MEDICINE

## 2021-09-28 PROCEDURE — 1090F PRES/ABSN URINE INCON ASSESS: CPT | Performed by: INTERNAL MEDICINE

## 2021-09-28 RX ORDER — ALENDRONATE SODIUM 35 MG/1
35 TABLET ORAL WEEKLY
COMMUNITY
Start: 2021-07-22

## 2021-09-28 NOTE — PROGRESS NOTES
Pt seen in office today for blood work for BMP and Magnesium per Dr. Danny Medina. Pt tolerated blood draw from left arm.    Electronically signed by Ivett Dumont MA on 9/28/2021 at 1:49 PM

## 2022-08-17 ENCOUNTER — TELEPHONE (OUTPATIENT)
Dept: NON INVASIVE DIAGNOSTICS | Age: 72
End: 2022-08-17

## 2022-08-17 NOTE — TELEPHONE ENCOUNTER
I left a message for Kerry at [facility]. Kerry called yesterday afternoon and left a message. She wanted to know if Jonathan Basket was pacemaker dependent. I left a message on the staff voicemail that Jonathan Basket is not dependent. I asked they call back if they have any further questions.     Jarvis Grimes RN, BSN  [facility]

## 2022-08-17 NOTE — TELEPHONE ENCOUNTER
I received a message from Meño at [facility]. I left a message on the nurses' voicemail at the facility.     Van Willoughby RN, BSN  [facility]

## 2022-08-18 NOTE — TELEPHONE ENCOUNTER
I left a message for the radiation oncology nurses at the [facility] at [facility] in [place]. I told the nurses Mariel's last device check was 8/11/22. The check was WNL.     Kemar Espinoza RN, BSN  [facility]

## 2023-05-15 ENCOUNTER — TELEPHONE (OUTPATIENT)
Dept: NON INVASIVE DIAGNOSTICS | Age: 73
End: 2023-05-15

## 2023-05-18 ENCOUNTER — OFFICE VISIT (OUTPATIENT)
Dept: NON INVASIVE DIAGNOSTICS | Age: 73
End: 2023-05-18

## 2023-05-18 VITALS
HEIGHT: 65 IN | DIASTOLIC BLOOD PRESSURE: 68 MMHG | SYSTOLIC BLOOD PRESSURE: 128 MMHG | RESPIRATION RATE: 16 BRPM | BODY MASS INDEX: 30.49 KG/M2 | WEIGHT: 183 LBS | HEART RATE: 84 BPM

## 2023-05-18 DIAGNOSIS — Z95.0 PACEMAKER: Primary | ICD-10-CM

## 2023-05-18 DIAGNOSIS — I48.0 PAROXYSMAL ATRIAL FIBRILLATION (HCC): ICD-10-CM

## 2023-05-18 DIAGNOSIS — I49.5 SINUS NODE DYSFUNCTION (HCC): ICD-10-CM

## 2023-05-18 PROBLEM — I21.A1 MYOCARDIAL INFARCTION TYPE 2 (HCC): Status: ACTIVE | Noted: 2023-05-18

## 2023-05-18 PROBLEM — D47.3 ESSENTIAL THROMBOCYTHEMIA (HCC): Status: ACTIVE | Noted: 2023-05-18

## 2023-05-18 PROBLEM — U07.1 COVID-19: Status: ACTIVE | Noted: 2023-05-18

## 2023-05-18 PROBLEM — N93.0 POSTCOITAL AND CONTACT BLEEDING: Status: ACTIVE | Noted: 2022-05-11

## 2023-05-18 PROBLEM — C77.5: Status: ACTIVE | Noted: 2023-05-18

## 2023-05-18 PROBLEM — R92.2 INCONCLUSIVE MAMMOGRAM: Status: ACTIVE | Noted: 2022-03-09

## 2023-05-18 PROBLEM — R68.89 OTHER GENERAL SYMPTOMS AND SIGNS: Status: ACTIVE | Noted: 2022-03-15

## 2023-05-18 PROBLEM — I34.0 NONRHEUMATIC MITRAL (VALVE) INSUFFICIENCY: Status: ACTIVE | Noted: 2023-05-18

## 2023-05-18 PROBLEM — E78.2 MIXED HYPERLIPIDEMIA: Status: ACTIVE | Noted: 2022-01-06

## 2023-05-18 PROBLEM — R87.619 UNSPECIFIED ABNORMAL CYTOLOGICAL FINDINGS IN SPECIMENS FROM CERVIX UTERI: Status: ACTIVE | Noted: 2022-05-09

## 2023-05-18 PROBLEM — E55.9 VITAMIN D DEFICIENCY, UNSPECIFIED: Status: ACTIVE | Noted: 2022-01-06

## 2023-05-18 PROBLEM — J18.9 PNEUMONIA: Status: ACTIVE | Noted: 2023-05-18

## 2023-05-18 PROBLEM — G89.3 CANCER ASSOCIATED PAIN: Status: ACTIVE | Noted: 2023-05-18

## 2023-05-18 PROBLEM — Z20.822 CONTACT WITH AND (SUSPECTED) EXPOSURE TO COVID-19: Status: ACTIVE | Noted: 2022-03-15

## 2023-05-18 PROBLEM — R74.01 ELEVATED TRANSAMINASE LEVEL: Status: ACTIVE | Noted: 2023-05-18

## 2023-05-18 PROBLEM — R53.83 FATIGUE: Status: ACTIVE | Noted: 2023-05-18

## 2023-05-18 PROBLEM — R87.613 HIGH GRADE SQUAMOUS INTRAEPITHELIAL LESION ON CYTOLOGIC SMEAR OF CERVIX (HGSIL): Status: ACTIVE | Noted: 2022-05-09

## 2023-05-18 PROBLEM — R11.0 NAUSEA: Status: ACTIVE | Noted: 2023-05-18

## 2023-05-18 PROBLEM — J96.01 ACUTE HYPOXEMIC RESPIRATORY FAILURE (HCC): Status: ACTIVE | Noted: 2023-05-18

## 2023-05-18 PROBLEM — I21.4 ACUTE NON-ST ELEVATION MYOCARDIAL INFARCTION (NSTEMI) (HCC): Status: ACTIVE | Noted: 2023-05-18

## 2023-05-18 PROBLEM — R92.8 OTHER ABNORMAL AND INCONCLUSIVE FINDINGS ON DIAGNOSTIC IMAGING OF BREAST: Status: ACTIVE | Noted: 2022-03-09

## 2023-05-18 PROBLEM — Z95.5 H/O HEART ARTERY STENT: Status: ACTIVE | Noted: 2023-05-18

## 2023-05-18 NOTE — PROGRESS NOTES
Cardiac Electrophysiology Outpatient Progress Note    Hung Jarquin  1950  Date of Service: 5/17/2023  Referring Provider/PCP: DONALD Wills NP  Primary Electrophysiologist: Brisa Thakkar MD    Chief Complaint: Persistent atrial fibrillation on Sotalol therapy    SUBJECTIVE: Hung Jarquin presents to the office today for a follow -up. The patient currently feels well, denies a reoccurrence of AF and is maintaining SR on Sotalol  therapy. She was last seen in clinic 09/28/2021, since that time she was found to have stage IIb cervical cancer and underwent chemo and radiation however her last MRI and PET scans were encouraging. She remains on sotalol with a QTc of 436ms and a CrCl of 69 mL/min based on a creatinine of 0.8 from 08/29/2022. Her AF burden is 0% and the device site is well healed however she did get a burn to the lateral left area of the site 2 days ago that is redden but non tender and does not appear to involve the pocket. She reports intermittent palpitation associated with hypomagnesemia. The patient denies any chest pain, dyspnea, palpitations, dizziness, syncope, orthopnea or paroxysmal nocturnal dyspnea.          Patient Active Problem List    Diagnosis Date Noted    Malignant neoplasm of exocervix [facility])      Priority: Medium    Transient visual loss 09/28/2021    Pacemaker 02/18/2020     Overview Note:     Medtronic       TIA (transient ischemic attack) 11/06/2019    Transient visual loss of both eyes     Obesity (BMI 30.0-34.9) 09/27/2016    VHD (valvular heart disease) 09/27/2016     Overview Note:     Moderate MR       Sinus node dysfunction ([address] 02/17/2016    CAD (coronary artery disease)     Atrial fibrillation (HCC)        Current Outpatient Medications   Medication Sig Dispense Refill    vitamin D3 (CHOLECALCIFEROL) 125 MCG (5000 UT) TABS tablet Take 5,000 Units by mouth daily      ipratropium-albuterol (DUONEB) 0.5-2.5 (3) MG/3ML SOLN nebulizer solution Inhale 3 mLs

## 2023-05-31 LAB
ALBUMIN SERPL-MCNC: 3.8 G/DL
ALP BLD-CCNC: 67 U/L
ALT SERPL-CCNC: 15 U/L
ANION GAP SERPL CALCULATED.3IONS-SCNC: NORMAL MMOL/L
AST SERPL-CCNC: 26 U/L
BILIRUB SERPL-MCNC: 0.4 MG/DL (ref 0.1–1.4)
BUN BLDV-MCNC: 13 MG/DL
CALCIUM SERPL-MCNC: 9 MG/DL
CHLORIDE BLD-SCNC: 107 MMOL/L
CO2: 27 MMOL/L
CREAT SERPL-MCNC: 0.89 MG/DL
EGFR: >60
GLUCOSE BLD-MCNC: 103 MG/DL
POTASSIUM SERPL-SCNC: 4.1 MMOL/L
SODIUM BLD-SCNC: 141 MMOL/L
TOTAL PROTEIN: 6.8

## 2023-08-27 PROCEDURE — 93294 REM INTERROG EVL PM/LDLS PM: CPT | Performed by: INTERNAL MEDICINE

## 2023-08-27 PROCEDURE — 93296 REM INTERROG EVL PM/IDS: CPT | Performed by: INTERNAL MEDICINE

## 2024-02-23 PROBLEM — I21.A1 MYOCARDIAL INFARCTION TYPE 2 (HCC): Status: RESOLVED | Noted: 2023-05-18 | Resolved: 2024-02-23

## 2024-02-23 PROBLEM — I25.2 HISTORY OF ACUTE MYOCARDIAL INFARCTION: Status: ACTIVE | Noted: 2023-05-18

## 2024-10-03 ENCOUNTER — OFFICE VISIT (OUTPATIENT)
Dept: NON INVASIVE DIAGNOSTICS | Age: 74
End: 2024-10-03
Payer: MEDICARE

## 2024-10-03 VITALS
HEIGHT: 64 IN | RESPIRATION RATE: 16 BRPM | DIASTOLIC BLOOD PRESSURE: 70 MMHG | HEART RATE: 71 BPM | BODY MASS INDEX: 31.51 KG/M2 | SYSTOLIC BLOOD PRESSURE: 128 MMHG | WEIGHT: 184.6 LBS

## 2024-10-03 DIAGNOSIS — I48.0 PAROXYSMAL ATRIAL FIBRILLATION (HCC): Primary | ICD-10-CM

## 2024-10-03 DIAGNOSIS — Z95.0 PACEMAKER: ICD-10-CM

## 2024-10-03 PROCEDURE — G8417 CALC BMI ABV UP PARAM F/U: HCPCS | Performed by: NURSE PRACTITIONER

## 2024-10-03 PROCEDURE — 1090F PRES/ABSN URINE INCON ASSESS: CPT | Performed by: NURSE PRACTITIONER

## 2024-10-03 PROCEDURE — 93000 ELECTROCARDIOGRAM COMPLETE: CPT | Performed by: INTERNAL MEDICINE

## 2024-10-03 PROCEDURE — G8400 PT W/DXA NO RESULTS DOC: HCPCS | Performed by: NURSE PRACTITIONER

## 2024-10-03 PROCEDURE — 99215 OFFICE O/P EST HI 40 MIN: CPT | Performed by: NURSE PRACTITIONER

## 2024-10-03 PROCEDURE — G8484 FLU IMMUNIZE NO ADMIN: HCPCS | Performed by: NURSE PRACTITIONER

## 2024-10-03 PROCEDURE — 4004F PT TOBACCO SCREEN RCVD TLK: CPT | Performed by: NURSE PRACTITIONER

## 2024-10-03 PROCEDURE — G8427 DOCREV CUR MEDS BY ELIG CLIN: HCPCS | Performed by: NURSE PRACTITIONER

## 2024-10-03 PROCEDURE — 1123F ACP DISCUSS/DSCN MKR DOCD: CPT | Performed by: NURSE PRACTITIONER

## 2024-10-03 RX ORDER — MAGNESIUM 30 MG
400 TABLET ORAL DAILY
COMMUNITY

## 2024-10-03 RX ORDER — POTASSIUM CHLORIDE 1.5 G/1.58G
20 POWDER, FOR SOLUTION ORAL DAILY
COMMUNITY

## 2024-10-03 NOTE — PROGRESS NOTES
Cardiac Electrophysiology Outpatient Progress Note    Mariel Anderson  1950  Date of Service: 10/3/2024  Referring Provider/PCP: Epifanio Simpson APRN - NP  Primary Electrophysiologist: Rozina Rogers MD    Chief Complaint: Persistent atrial fibrillation on Sotalol therapy    SUBJECTIVE: Mariel Anderson presents to the office today for a follow -up. The patient currently feels well, denies a reoccurrence of AF and is maintaining SR on Sotalol  therapy.  Today she has an AF burden of 0% and no cardiac complaints. She is recovering from bilateral hip fractures yet remains active around her house without limitation. Her device is well healed her QTc today is 438ms.          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    Nausea 05/18/2023    Nonrheumatic mitral (valve) insufficiency 05/18/2023    Pneumonia 05/18/2023    Postcoital and contact bleeding 05/11/2022    High grade squamous intraepithelial lesion on cytologic smear of cervix (HGSIL) 05/09/2022    Unspecified abnormal cytological findings in specimens from cervix uteri 05/09/2022    Contact with and (suspected) exposure to covid-19 03/15/2022    Other general symptoms and signs 03/15/2022    Inconclusive mammogram 03/09/2022    Other abnormal and inconclusive findings on diagnostic imaging of breast 03/09/2022    Mixed hyperlipidemia 01/06/2022    Vitamin D deficiency, unspecified 01/06/2022    Transient visual loss 09/28/2021    Pacemaker 02/18/2020     Overview Note:     Medtronic       TIA (transient ischemic

## 2024-10-04 ENCOUNTER — TELEPHONE (OUTPATIENT)
Dept: NON INVASIVE DIAGNOSTICS | Age: 74
End: 2024-10-04

## 2024-10-04 NOTE — TELEPHONE ENCOUNTER
----- Message from DONALD Gerard CNP sent at 10/3/2024  3:13 PM EDT -----  Please let he know that the labs are stable and she is ok to Continue on Sotalol at this time. Thanks.  ----- Message -----  From: Fina Sandoval MA  Sent: 10/3/2024   2:57 PM EDT  To: DONALD Kraus CNP    Labs results are in chart  ----- Message -----  From: Dre Pichardo APRN - CNP  Sent: 10/3/2024  12:52 PM EDT  To: Fnia Sandoval MA    Please ask her PCP for recent labs, thanks.

## 2024-10-04 NOTE — TELEPHONE ENCOUNTER
Pt notified of results and verbalized understanding.    Electronically signed by Fina Sandoval MA on 10/4/2024 at 8:21 AM